# Patient Record
Sex: FEMALE | Race: BLACK OR AFRICAN AMERICAN | NOT HISPANIC OR LATINO | URBAN - METROPOLITAN AREA
[De-identification: names, ages, dates, MRNs, and addresses within clinical notes are randomized per-mention and may not be internally consistent; named-entity substitution may affect disease eponyms.]

---

## 2024-01-07 ENCOUNTER — INPATIENT (INPATIENT)
Facility: HOSPITAL | Age: 74
LOS: 2 days | Discharge: HOME HEALTH | DRG: 552 | End: 2024-01-10
Attending: STUDENT IN AN ORGANIZED HEALTH CARE EDUCATION/TRAINING PROGRAM | Admitting: STUDENT IN AN ORGANIZED HEALTH CARE EDUCATION/TRAINING PROGRAM
Payer: MEDICARE

## 2024-01-07 VITALS
RESPIRATION RATE: 16 BRPM | SYSTOLIC BLOOD PRESSURE: 151 MMHG | TEMPERATURE: 98 F | WEIGHT: 175.05 LBS | HEART RATE: 88 BPM | HEIGHT: 66 IN | OXYGEN SATURATION: 98 % | DIASTOLIC BLOOD PRESSURE: 84 MMHG

## 2024-01-07 DIAGNOSIS — M54.30 SCIATICA, UNSPECIFIED SIDE: ICD-10-CM

## 2024-01-07 DIAGNOSIS — I10 ESSENTIAL (PRIMARY) HYPERTENSION: ICD-10-CM

## 2024-01-07 DIAGNOSIS — Z29.9 ENCOUNTER FOR PROPHYLACTIC MEASURES, UNSPECIFIED: ICD-10-CM

## 2024-01-07 DIAGNOSIS — M54.50 LOW BACK PAIN, UNSPECIFIED: ICD-10-CM

## 2024-01-07 DIAGNOSIS — Z85.3 PERSONAL HISTORY OF MALIGNANT NEOPLASM OF BREAST: ICD-10-CM

## 2024-01-07 LAB
ANION GAP SERPL CALC-SCNC: 12 MMOL/L — SIGNIFICANT CHANGE UP (ref 5–17)
ANION GAP SERPL CALC-SCNC: 12 MMOL/L — SIGNIFICANT CHANGE UP (ref 5–17)
APPEARANCE UR: CLEAR — SIGNIFICANT CHANGE UP
APPEARANCE UR: CLEAR — SIGNIFICANT CHANGE UP
BACTERIA # UR AUTO: NEGATIVE /HPF — SIGNIFICANT CHANGE UP
BACTERIA # UR AUTO: NEGATIVE /HPF — SIGNIFICANT CHANGE UP
BASOPHILS # BLD AUTO: 0.04 K/UL — SIGNIFICANT CHANGE UP (ref 0–0.2)
BASOPHILS # BLD AUTO: 0.04 K/UL — SIGNIFICANT CHANGE UP (ref 0–0.2)
BASOPHILS NFR BLD AUTO: 0.3 % — SIGNIFICANT CHANGE UP (ref 0–2)
BASOPHILS NFR BLD AUTO: 0.3 % — SIGNIFICANT CHANGE UP (ref 0–2)
BILIRUB UR-MCNC: NEGATIVE — SIGNIFICANT CHANGE UP
BILIRUB UR-MCNC: NEGATIVE — SIGNIFICANT CHANGE UP
BUN SERPL-MCNC: 12 MG/DL — SIGNIFICANT CHANGE UP (ref 7–23)
BUN SERPL-MCNC: 12 MG/DL — SIGNIFICANT CHANGE UP (ref 7–23)
CALCIUM SERPL-MCNC: 9.6 MG/DL — SIGNIFICANT CHANGE UP (ref 8.4–10.5)
CALCIUM SERPL-MCNC: 9.6 MG/DL — SIGNIFICANT CHANGE UP (ref 8.4–10.5)
CAST: 0 /LPF — SIGNIFICANT CHANGE UP (ref 0–4)
CAST: 0 /LPF — SIGNIFICANT CHANGE UP (ref 0–4)
CHLORIDE SERPL-SCNC: 103 MMOL/L — SIGNIFICANT CHANGE UP (ref 96–108)
CHLORIDE SERPL-SCNC: 103 MMOL/L — SIGNIFICANT CHANGE UP (ref 96–108)
CO2 SERPL-SCNC: 24 MMOL/L — SIGNIFICANT CHANGE UP (ref 22–31)
CO2 SERPL-SCNC: 24 MMOL/L — SIGNIFICANT CHANGE UP (ref 22–31)
COLOR SPEC: YELLOW — SIGNIFICANT CHANGE UP
COLOR SPEC: YELLOW — SIGNIFICANT CHANGE UP
CREAT SERPL-MCNC: 0.88 MG/DL — SIGNIFICANT CHANGE UP (ref 0.5–1.3)
CREAT SERPL-MCNC: 0.88 MG/DL — SIGNIFICANT CHANGE UP (ref 0.5–1.3)
DIFF PNL FLD: NEGATIVE — SIGNIFICANT CHANGE UP
DIFF PNL FLD: NEGATIVE — SIGNIFICANT CHANGE UP
EGFR: 70 ML/MIN/1.73M2 — SIGNIFICANT CHANGE UP
EGFR: 70 ML/MIN/1.73M2 — SIGNIFICANT CHANGE UP
EOSINOPHIL # BLD AUTO: 0.04 K/UL — SIGNIFICANT CHANGE UP (ref 0–0.5)
EOSINOPHIL # BLD AUTO: 0.04 K/UL — SIGNIFICANT CHANGE UP (ref 0–0.5)
EOSINOPHIL NFR BLD AUTO: 0.3 % — SIGNIFICANT CHANGE UP (ref 0–6)
EOSINOPHIL NFR BLD AUTO: 0.3 % — SIGNIFICANT CHANGE UP (ref 0–6)
GLUCOSE SERPL-MCNC: 103 MG/DL — HIGH (ref 70–99)
GLUCOSE SERPL-MCNC: 103 MG/DL — HIGH (ref 70–99)
GLUCOSE UR QL: NEGATIVE MG/DL — SIGNIFICANT CHANGE UP
GLUCOSE UR QL: NEGATIVE MG/DL — SIGNIFICANT CHANGE UP
HCT VFR BLD CALC: 38.4 % — SIGNIFICANT CHANGE UP (ref 34.5–45)
HCT VFR BLD CALC: 38.4 % — SIGNIFICANT CHANGE UP (ref 34.5–45)
HGB BLD-MCNC: 13 G/DL — SIGNIFICANT CHANGE UP (ref 11.5–15.5)
HGB BLD-MCNC: 13 G/DL — SIGNIFICANT CHANGE UP (ref 11.5–15.5)
IMM GRANULOCYTES NFR BLD AUTO: 0.2 % — SIGNIFICANT CHANGE UP (ref 0–0.9)
IMM GRANULOCYTES NFR BLD AUTO: 0.2 % — SIGNIFICANT CHANGE UP (ref 0–0.9)
KETONES UR-MCNC: NEGATIVE MG/DL — SIGNIFICANT CHANGE UP
KETONES UR-MCNC: NEGATIVE MG/DL — SIGNIFICANT CHANGE UP
LEUKOCYTE ESTERASE UR-ACNC: ABNORMAL
LEUKOCYTE ESTERASE UR-ACNC: ABNORMAL
LYMPHOCYTES # BLD AUTO: 18.7 % — SIGNIFICANT CHANGE UP (ref 13–44)
LYMPHOCYTES # BLD AUTO: 18.7 % — SIGNIFICANT CHANGE UP (ref 13–44)
LYMPHOCYTES # BLD AUTO: 2.31 K/UL — SIGNIFICANT CHANGE UP (ref 1–3.3)
LYMPHOCYTES # BLD AUTO: 2.31 K/UL — SIGNIFICANT CHANGE UP (ref 1–3.3)
MCHC RBC-ENTMCNC: 27.9 PG — SIGNIFICANT CHANGE UP (ref 27–34)
MCHC RBC-ENTMCNC: 27.9 PG — SIGNIFICANT CHANGE UP (ref 27–34)
MCHC RBC-ENTMCNC: 33.9 GM/DL — SIGNIFICANT CHANGE UP (ref 32–36)
MCHC RBC-ENTMCNC: 33.9 GM/DL — SIGNIFICANT CHANGE UP (ref 32–36)
MCV RBC AUTO: 82.4 FL — SIGNIFICANT CHANGE UP (ref 80–100)
MCV RBC AUTO: 82.4 FL — SIGNIFICANT CHANGE UP (ref 80–100)
MONOCYTES # BLD AUTO: 0.74 K/UL — SIGNIFICANT CHANGE UP (ref 0–0.9)
MONOCYTES # BLD AUTO: 0.74 K/UL — SIGNIFICANT CHANGE UP (ref 0–0.9)
MONOCYTES NFR BLD AUTO: 6 % — SIGNIFICANT CHANGE UP (ref 2–14)
MONOCYTES NFR BLD AUTO: 6 % — SIGNIFICANT CHANGE UP (ref 2–14)
NEUTROPHILS # BLD AUTO: 9.19 K/UL — HIGH (ref 1.8–7.4)
NEUTROPHILS # BLD AUTO: 9.19 K/UL — HIGH (ref 1.8–7.4)
NEUTROPHILS NFR BLD AUTO: 74.5 % — SIGNIFICANT CHANGE UP (ref 43–77)
NEUTROPHILS NFR BLD AUTO: 74.5 % — SIGNIFICANT CHANGE UP (ref 43–77)
NITRITE UR-MCNC: NEGATIVE — SIGNIFICANT CHANGE UP
NITRITE UR-MCNC: NEGATIVE — SIGNIFICANT CHANGE UP
NRBC # BLD: 0 /100 WBCS — SIGNIFICANT CHANGE UP (ref 0–0)
NRBC # BLD: 0 /100 WBCS — SIGNIFICANT CHANGE UP (ref 0–0)
PH UR: 6.5 — SIGNIFICANT CHANGE UP (ref 5–8)
PH UR: 6.5 — SIGNIFICANT CHANGE UP (ref 5–8)
PLATELET # BLD AUTO: 235 K/UL — SIGNIFICANT CHANGE UP (ref 150–400)
PLATELET # BLD AUTO: 235 K/UL — SIGNIFICANT CHANGE UP (ref 150–400)
POTASSIUM SERPL-MCNC: 3.8 MMOL/L — SIGNIFICANT CHANGE UP (ref 3.5–5.3)
POTASSIUM SERPL-MCNC: 3.8 MMOL/L — SIGNIFICANT CHANGE UP (ref 3.5–5.3)
POTASSIUM SERPL-SCNC: 3.8 MMOL/L — SIGNIFICANT CHANGE UP (ref 3.5–5.3)
POTASSIUM SERPL-SCNC: 3.8 MMOL/L — SIGNIFICANT CHANGE UP (ref 3.5–5.3)
PROT UR-MCNC: 30 MG/DL
PROT UR-MCNC: 30 MG/DL
RBC # BLD: 4.66 M/UL — SIGNIFICANT CHANGE UP (ref 3.8–5.2)
RBC # BLD: 4.66 M/UL — SIGNIFICANT CHANGE UP (ref 3.8–5.2)
RBC # FLD: 14.6 % — HIGH (ref 10.3–14.5)
RBC # FLD: 14.6 % — HIGH (ref 10.3–14.5)
RBC CASTS # UR COMP ASSIST: 1 /HPF — SIGNIFICANT CHANGE UP (ref 0–4)
RBC CASTS # UR COMP ASSIST: 1 /HPF — SIGNIFICANT CHANGE UP (ref 0–4)
SODIUM SERPL-SCNC: 139 MMOL/L — SIGNIFICANT CHANGE UP (ref 135–145)
SODIUM SERPL-SCNC: 139 MMOL/L — SIGNIFICANT CHANGE UP (ref 135–145)
SP GR SPEC: 1.01 — SIGNIFICANT CHANGE UP (ref 1–1.03)
SP GR SPEC: 1.01 — SIGNIFICANT CHANGE UP (ref 1–1.03)
SQUAMOUS # UR AUTO: 1 /HPF — SIGNIFICANT CHANGE UP (ref 0–5)
SQUAMOUS # UR AUTO: 1 /HPF — SIGNIFICANT CHANGE UP (ref 0–5)
UROBILINOGEN FLD QL: 0.2 MG/DL — SIGNIFICANT CHANGE UP (ref 0.2–1)
UROBILINOGEN FLD QL: 0.2 MG/DL — SIGNIFICANT CHANGE UP (ref 0.2–1)
WBC # BLD: 12.35 K/UL — HIGH (ref 3.8–10.5)
WBC # BLD: 12.35 K/UL — HIGH (ref 3.8–10.5)
WBC # FLD AUTO: 12.35 K/UL — HIGH (ref 3.8–10.5)
WBC # FLD AUTO: 12.35 K/UL — HIGH (ref 3.8–10.5)
WBC UR QL: 2 /HPF — SIGNIFICANT CHANGE UP (ref 0–5)
WBC UR QL: 2 /HPF — SIGNIFICANT CHANGE UP (ref 0–5)

## 2024-01-07 PROCEDURE — 72131 CT LUMBAR SPINE W/O DYE: CPT | Mod: 26,MG

## 2024-01-07 PROCEDURE — 99285 EMERGENCY DEPT VISIT HI MDM: CPT | Mod: 25

## 2024-01-07 PROCEDURE — 99223 1ST HOSP IP/OBS HIGH 75: CPT | Mod: GC

## 2024-01-07 PROCEDURE — G1004: CPT

## 2024-01-07 RX ORDER — ENOXAPARIN SODIUM 100 MG/ML
40 INJECTION SUBCUTANEOUS EVERY 24 HOURS
Refills: 0 | Status: DISCONTINUED | OUTPATIENT
Start: 2024-01-07 | End: 2024-01-10

## 2024-01-07 RX ORDER — ONDANSETRON 8 MG/1
4 TABLET, FILM COATED ORAL EVERY 8 HOURS
Refills: 0 | Status: DISCONTINUED | OUTPATIENT
Start: 2024-01-07 | End: 2024-01-10

## 2024-01-07 RX ORDER — ACETAMINOPHEN 500 MG
650 TABLET ORAL EVERY 6 HOURS
Refills: 0 | Status: DISCONTINUED | OUTPATIENT
Start: 2024-01-07 | End: 2024-01-10

## 2024-01-07 RX ORDER — MORPHINE SULFATE 50 MG/1
2 CAPSULE, EXTENDED RELEASE ORAL ONCE
Refills: 0 | Status: DISCONTINUED | OUTPATIENT
Start: 2024-01-07 | End: 2024-01-07

## 2024-01-07 RX ORDER — TRAMADOL HYDROCHLORIDE 50 MG/1
50 TABLET ORAL ONCE
Refills: 0 | Status: DISCONTINUED | OUTPATIENT
Start: 2024-01-07 | End: 2024-01-07

## 2024-01-07 RX ORDER — LANOLIN ALCOHOL/MO/W.PET/CERES
3 CREAM (GRAM) TOPICAL AT BEDTIME
Refills: 0 | Status: DISCONTINUED | OUTPATIENT
Start: 2024-01-07 | End: 2024-01-10

## 2024-01-07 RX ORDER — ACETAMINOPHEN WITH CODEINE 300MG-30MG
1 TABLET ORAL ONCE
Refills: 0 | Status: DISCONTINUED | OUTPATIENT
Start: 2024-01-07 | End: 2024-01-07

## 2024-01-07 RX ORDER — AMLODIPINE BESYLATE 2.5 MG/1
10 TABLET ORAL DAILY
Refills: 0 | Status: DISCONTINUED | OUTPATIENT
Start: 2024-01-07 | End: 2024-01-10

## 2024-01-07 RX ORDER — DIAZEPAM 5 MG
5 TABLET ORAL ONCE
Refills: 0 | Status: DISCONTINUED | OUTPATIENT
Start: 2024-01-07 | End: 2024-01-07

## 2024-01-07 RX ORDER — KETOROLAC TROMETHAMINE 30 MG/ML
15 SYRINGE (ML) INJECTION ONCE
Refills: 0 | Status: DISCONTINUED | OUTPATIENT
Start: 2024-01-07 | End: 2024-01-07

## 2024-01-07 RX ORDER — METHOCARBAMOL 500 MG/1
750 TABLET, FILM COATED ORAL ONCE
Refills: 0 | Status: COMPLETED | OUTPATIENT
Start: 2024-01-07 | End: 2024-01-07

## 2024-01-07 RX ORDER — BACLOFEN 100 %
5 POWDER (GRAM) MISCELLANEOUS EVERY 12 HOURS
Refills: 0 | Status: DISCONTINUED | OUTPATIENT
Start: 2024-01-07 | End: 2024-01-10

## 2024-01-07 RX ADMIN — TRAMADOL HYDROCHLORIDE 50 MILLIGRAM(S): 50 TABLET ORAL at 12:56

## 2024-01-07 RX ADMIN — Medication 650 MILLIGRAM(S): at 22:51

## 2024-01-07 RX ADMIN — Medication 15 MILLIGRAM(S): at 07:51

## 2024-01-07 RX ADMIN — Medication 5 MILLIGRAM(S): at 10:01

## 2024-01-07 RX ADMIN — MORPHINE SULFATE 2 MILLIGRAM(S): 50 CAPSULE, EXTENDED RELEASE ORAL at 11:08

## 2024-01-07 RX ADMIN — Medication 15 MILLIGRAM(S): at 08:50

## 2024-01-07 RX ADMIN — Medication 1 TABLET(S): at 07:50

## 2024-01-07 RX ADMIN — AMLODIPINE BESYLATE 10 MILLIGRAM(S): 2.5 TABLET ORAL at 19:48

## 2024-01-07 RX ADMIN — Medication 650 MILLIGRAM(S): at 23:51

## 2024-01-07 RX ADMIN — METHOCARBAMOL 750 MILLIGRAM(S): 500 TABLET, FILM COATED ORAL at 07:50

## 2024-01-07 RX ADMIN — Medication 1 TABLET(S): at 20:31

## 2024-01-07 RX ADMIN — Medication 3 MILLIGRAM(S): at 22:51

## 2024-01-07 NOTE — PHYSICAL THERAPY INITIAL EVALUATION ADULT - FOLLOWS COMMANDS/ANSWERS QUESTIONS, REHAB EVAL
easily distractible requiring redirecting to task at hand frequently throughout session/100% of the time

## 2024-01-07 NOTE — PHYSICAL THERAPY INITIAL EVALUATION ADULT - PERTINENT HX OF CURRENT PROBLEM, REHAB EVAL
This is a 72 y/o f hx HTN, sciatica, breast CA (remission) presents c/o left buttock pain radiating to left leg today.  Pt stating she feels "pulling" in the area which feels similar to muscle spasms she has had in the past.  Pt stating her symptoms started gradually 2 days ago and has worsened since then.  Pt took gabapentin without improvement.  Pt stating today she hasn't been able to walk, tried getting out of bed but wasn't able to take a step due to pain so called EMS.  Pt denies numbness/tingling to ext, weakness, saddle anesthesia, bowel/bladder incontinence, trauma, fall, all other ROS negative. This is a 74 y/o f hx HTN, sciatica, breast CA (remission) presents c/o left buttock pain radiating to left leg today.  Pt stating she feels "pulling" in the area which feels similar to muscle spasms she has had in the past.  Pt stating her symptoms started gradually 2 days ago and has worsened since then.  Pt took gabapentin without improvement.  Pt stating today she hasn't been able to walk, tried getting out of bed but wasn't able to take a step due to pain so called EMS.  Pt denies numbness/tingling to ext, weakness, saddle anesthesia, bowel/bladder incontinence, trauma, fall, all other ROS negative.

## 2024-01-07 NOTE — PHYSICAL THERAPY INITIAL EVALUATION ADULT - IMPAIRMENTS FOUND, PT EVAL
aerobic capacity/endurance/ergonomics and body mechanics/gait, locomotion, and balance/gross motor/muscle strength/poor safety awareness/posture/ROM

## 2024-01-07 NOTE — PHYSICAL THERAPY INITIAL EVALUATION ADULT - GENERAL OBSERVATIONS, REHAB EVAL
Pt received semi-supine in bed, no acute distress, cleared for PT by ED RN and CM. Left as found semi-supine in bed no acute distress VSS.

## 2024-01-07 NOTE — PHYSICAL THERAPY INITIAL EVALUATION ADULT - IMPAIRMENTS CONTRIBUTING TO GAIT DEVIATIONS, PT EVAL
distance limited by pt reporting 10/10 pain in LLE despite pre-medication for session/impaired balance/impaired coordination/decreased flexibility/pain/impaired postural control/decreased ROM/decreased strength

## 2024-01-07 NOTE — H&P ADULT - HISTORY OF PRESENT ILLNESS
PCP:   Pharmacy:   - - - - -    HPI:     In the ED:  Initial vital signs: T: XX F, HR: XX, BP: XX, R: XX, SpO2: XX% on RA  ED course:   Labs: significant for  Imaging:  CXR:   EKG:   Medications:   Consults: none      PCP:   Pharmacy:   - - - - -    HPI: This is a 74 y/o f hx HTN, sciatica, breast CA (remission) presents c/o left buttock pain radiating to left leg today.  Pt stating she feels "pulling" in the area which feels similar to muscle spasms she has had in the past.  Pt stating her symptoms started gradually 2 days ago and has worsened since then.  Pt took gabapentin without improvement.  Pt stating today she hasn't been able to walk, tried getting out of bed but wasn't able to take a step due to pain so called EMS.  Pt denies numbness/tingling to ext, weakness, saddle anesthesia, bowel/bladder incontinence, trauma, fall, all other ROS negative.    In the ED:  Initial vital signs: T: 98F, HR: 88, BP: 151/84 R: 16, SpO2: 98% on RA  ED course:   Labs: significant for WBC 12.35, CMP WNL, UA negative  Imaging: CT L-spine shows:  No evidence of acute osseous fracture or jacy dislocation. Multilevel degenerative change of lumbar spine, most significantly at the L2-L3 level where there is moderate to severe narrowing of the spinal canal. In the setting of neuropathy, recommend MRI of the lumbar spine  for further evaluation.   CXR: NI  EKG: Pending  Medications: s/p tramadol, morphine 2mg IVP x1, robaxin 750mg x1  Consults: PT   PCP: Dr Bill  - - - - -    HPI: This is a 74 y/o f hx HTN, sciatica, breast CA (remission) presents c/o left buttock pain radiating to left leg today.  Pt stating she feels "pulling" in the area which feels similar to muscle spasms she has had in the past.  Pt stating her symptoms started gradually 2 days ago and has worsened since then.  Pt took gabapentin without improvement.  Pt stating today she hasn't been able to walk, tried getting out of bed but wasn't able to take a step due to pain so called EMS.  Pt denies numbness/tingling to ext, weakness, saddle anesthesia, bowel/bladder incontinence, trauma, fall, all other ROS negative.    In the ED:  Initial vital signs: T: 98F, HR: 88, BP: 151/84 R: 16, SpO2: 98% on RA  ED course:   Labs: significant for WBC 12.35, CMP WNL, UA negative  Imaging: CT L-spine shows:  No evidence of acute osseous fracture or jacy dislocation. Multilevel degenerative change of lumbar spine, most significantly at the L2-L3 level where there is moderate to severe narrowing of the spinal canal. In the setting of neuropathy, recommend MRI of the lumbar spine  for further evaluation.   CXR: NI  EKG: Pending  Medications: s/p tramadol, morphine 2mg IVP x1, robaxin 750mg x1  Consults: PT   PCP: Dr Bill  - - - - -    HPI: This is a 72 y/o f hx HTN, sciatica, breast CA (remission) presents c/o left buttock pain radiating to left leg today.  Pt stating she feels "pulling" in the area which feels similar to muscle spasms she has had in the past.  Pt stating her symptoms started gradually 2 days ago and has worsened since then.  Pt took gabapentin without improvement.  Pt stating today she hasn't been able to walk, tried getting out of bed but wasn't able to take a step due to pain so called EMS.  Pt denies numbness/tingling to ext, weakness, saddle anesthesia, bowel/bladder incontinence, trauma, fall, all other ROS negative.    In the ED:  Initial vital signs: T: 98F, HR: 88, BP: 151/84 R: 16, SpO2: 98% on RA  ED course:   Labs: significant for WBC 12.35, CMP WNL, UA negative  Imaging: CT L-spine shows:  No evidence of acute osseous fracture or jacy dislocation. Multilevel degenerative change of lumbar spine, most significantly at the L2-L3 level where there is moderate to severe narrowing of the spinal canal. In the setting of neuropathy, recommend MRI of the lumbar spine  for further evaluation.   CXR: NI  EKG: Pending  Medications: s/p tramadol, morphine 2mg IVP x1, robaxin 750mg x1  Consults: PT

## 2024-01-07 NOTE — PHYSICAL THERAPY INITIAL EVALUATION ADULT - IMPAIRMENTS CONTRIBUTING IMPAIRED BED MOBILITY, REHAB EVAL
Negative impaired balance/impaired coordination/decreased flexibility/pain/impaired postural control/decreased ROM/decreased strength

## 2024-01-07 NOTE — ED PROVIDER NOTE - OBJECTIVE STATEMENT
72 y/o f hx HTN, sciatica, breast CA (remission) presents c/o left buttock pain radiating to left leg today.  Pt stating she feels "pulling" in the area which feels similar to muscle spasms she has had in the past.  Pt stating her symptoms started gradually 2 days ago and has worsened since then.  Pt took gabapentin without improvement.  Pt stating today she hasn't been able to walk, tried getting out of bed but wasn't able to take a step due to pain so called EMS.  Pt denies numbness/tingling to ext, weakness, saddle anesthesia, bowel/bladder incontinence, trauma, fall, all other ROS negative. 74 y/o f hx HTN, sciatica, breast CA (remission) presents c/o left buttock pain radiating to left leg today.  Pt stating she feels "pulling" in the area which feels similar to muscle spasms she has had in the past.  Pt stating her symptoms started gradually 2 days ago and has worsened since then.  Pt took gabapentin without improvement.  Pt stating today she hasn't been able to walk, tried getting out of bed but wasn't able to take a step due to pain so called EMS.  Pt denies numbness/tingling to ext, weakness, saddle anesthesia, bowel/bladder incontinence, trauma, fall, all other ROS negative.

## 2024-01-07 NOTE — ED PROVIDER NOTE - CLINICAL SUMMARY MEDICAL DECISION MAKING FREE TEXT BOX
74 y/o f hx HTN, sciatica, breast CA (remission) presents c/o gradually worsening pain to left low back/buttock radiating to left leg.  Sx consistent with sciatica, muscle spasm, no neuro deficits in ED.  Pt given pain meds, will get CT lumbar spine, f/u results and reassess for improvement.

## 2024-01-07 NOTE — PHYSICAL THERAPY INITIAL EVALUATION ADULT - ADDITIONAL COMMENTS
Pt lives alone in an elevator building, no [place], has ramp into building. Pt has [facility] services 3d x 4h on Mon, Tues & Wed 9am-1pm. Pt also has involved children. Equipment in home: Cane, rollator, [employer], commode near bed.

## 2024-01-07 NOTE — H&P ADULT - PROBLEM SELECTOR PLAN 5
F: NI  E: Replete as needed  N: Regular diet  Dvt ppx: Lovenox  GI ppx: NI  Code status: FULL code    #PT eval

## 2024-01-07 NOTE — PHYSICAL THERAPY INITIAL EVALUATION ADULT - GAIT DEVIATIONS NOTED, PT EVAL
decreased bladimir/increased time in double stance/decreased step length/decreased stride length/decreased weight-shifting ability

## 2024-01-07 NOTE — H&P ADULT - ATTENDING COMMENTS
#Lumbar radiculopathy: p/w acute on chronic back pain, worsened this week, with LLE sciatica. No Red flad symptoms- no incontinence. 5/5 MS b/l LE, and sensation intact. Ddx lumbar radiculopathy vs muscle strain/spasm. CT w/ mod-sever degenerative changes/spinal stenosis. F/up MRI, c/w pain control, baclofen, serial exams. PT recs

## 2024-01-07 NOTE — H&P ADULT - ASSESSMENT
This is a 72 y/o f hx HTN, sciatica, breast CA (remission) presents c/o left buttock and L groni pain and muscle spasms radiating to left leg today, admitted for further mgmt and PT eval.

## 2024-01-07 NOTE — PHYSICAL THERAPY INITIAL EVALUATION ADULT - NSPTDISCHREC_GEN_A_CORE
Sub-acute Rehab vs Home PT pending functional progress with mobility, balance, improvement in pain control with ambulation

## 2024-01-07 NOTE — H&P ADULT - PROBLEM SELECTOR PLAN 1
P/w  left buttock pain and muscle spasms radiating to left leg today. She has had similar episodes in the past.   CT L-spine shows:  No evidence of acute osseous fracture or jacy dislocation. Multilevel degenerative change of lumbar spine, most significantly at the L2-L3 level where there is moderate to severe narrowing of the spinal canal. In the setting of neuropathy, recommend MRI of the lumbar spine  for further evaluation.  S/p tramadol, morphine 2mg x1, methocarbamol 750mg x1, ketorolac 15mg x1, and valium 5mg x1.    Plan:  - PT eval  - Pain control; tylenol for mild, lidocaine patch to affected area P/w  left buttock pain and muscle spasms radiating to left leg today. She has had similar episodes in the past.   CT L-spine shows:  No evidence of acute osseous fracture or jacy dislocation. Multilevel degenerative change of lumbar spine, most significantly at the L2-L3 level where there is moderate to severe narrowing of the spinal canal. In the setting of neuropathy, recommend MRI of the lumbar spine  for further evaluation. Likely ddx spinal stenosis, piriformis syndrome given gluteal location and muscle spasms, disc herniation. No s/s of saddle anesthesia, bowel/bladder incontinence.  S/p tramadol, morphine 2mg x1, methocarbamol 750mg x1, ketorolac 15mg x1, and valium 5mg x1.  Neuro exam:    Plan:  - PT eval  - Pain control; tylenol for mild, lidocaine patch to affected area  - MR L-spine P/w  left buttock pain and muscle spasms radiating to left leg today. She has had similar episodes in the past.   CT L-spine shows:  No evidence of acute osseous fracture or jacy dislocation. Multilevel degenerative change of lumbar spine, most significantly at the L2-L3 level where there is moderate to severe narrowing of the spinal canal. In the setting of neuropathy, recommend MRI of the lumbar spine  for further evaluation. Likely ddx spinal stenosis, piriformis syndrome given gluteal location and muscle spasms, disc herniation. No s/s of saddle anesthesia, bowel/bladder incontinence. Walks independently.  S/p tramadol, morphine 2mg x1, methocarbamol 750mg x1, ketorolac 15mg x1, and valium 5mg x1.  Neuro exam: MSK 5/5, sensation intact, +straight leg raise test    Plan:  - PT eval  - Pain control; tylenol for mild, lidocaine patch to affected area  - MR L-spine

## 2024-01-07 NOTE — ED ADULT NURSE REASSESSMENT NOTE - NS ED NURSE REASSESS COMMENT FT1
Pt failed ambulatory trial with brian garner at bedside. Pending PT consult. Placed on Primafit. Pt educated on use and verbalized understanding.
Report from DAKOTA Sequeira. Pt received in stretcher c/o lower back pain radiating to pelvis and down b/l legs. Pt pending ct and ua/uc. Pt to CT scan. Updated as to plan of care. Pt verbalized understanding.
pt received from day shift RN - as per report pt to be admitted to the hospital for back pain - unable to amb due to the pain. at this time pt is sitting comfortably on stretcher states the pain improves when laying down and not moving, however "my left leg has an occasional spasm." pt was given BP medication prior to change of shift - denies any HA/lightheadedness/dizziness. at this time pt is pending report and transport to room. nursing plan continues at this time

## 2024-01-07 NOTE — H&P ADULT - NSHPPHYSICALEXAM_GEN_ALL_CORE
PHYSICAL EXAM:  Constitutional: NAD, comfortable in bed.  HEENT: NC/AT, PERRLA, EOMI, no conjunctival pallor or scleral icterus, mucus membranes moist.  Neck: Supple, no JVD. My thyromegaly or masses.  Respiratory: Clear to auscultation B/L. No wheezing, rales, rhonchi.  Cardiovascular: RRR, normal S1 and S2, no murmurs, rubs, gallops.  Gastrointestinal: +BS, soft NTND, no guarding or rebound tenderness, no palpable masses.  Extremities: Warm well perfused. No cyanosis, clubbing or edema.   Vascular: +2 pulses equal and strong throughout.   Neurological: AAOx3, no CN deficits, strength and sensation intact throughout.   Skin: No gross skin abnormalities or rashes.

## 2024-01-07 NOTE — PHYSICAL THERAPY INITIAL EVALUATION ADULT - CRITERIA FOR SKILLED THERAPEUTIC INTERVENTIONS
impairments found/functional limitations in following categories/risk reduction/prevention/rehab potential/therapy frequency/predicted duration of therapy intervention/anticipated equipment needs at discharge/anticipated discharge recommendation [facility] Counseling: I discussed with the patient the risks of valacyclovir including but not limited to kidney damage, nausea, vomiting and severe allergy.  The patient understands that if the infection seems to be worsening or is not improving, they are to call.

## 2024-01-07 NOTE — H&P ADULT - NSHPLABSRESULTS_GEN_ALL_CORE
LABS:                         13.0   12.35 )-----------( 235      ( 2024 07:30 )             38.4     01-07    139  |  103  |  12  ----------------------------<  103<H>  3.8   |  24  |  0.88    Ca    9.6      2024 07:30        Urinalysis Basic - ( 2024 08:15 )    Color: Yellow / Appearance: Clear / S.015 / pH: x  Gluc: x / Ketone: Negative mg/dL  / Bili: Negative / Urobili: 0.2 mg/dL   Blood: x / Protein: 30 mg/dL / Nitrite: Negative   Leuk Esterase: Trace / RBC: 1 /HPF / WBC 2 /HPF   Sq Epi: x / Non Sq Epi: 1 /HPF / Bacteria: Negative /HPF                RADIOLOGY, EKG & ADDITIONAL TESTS:

## 2024-01-07 NOTE — ED ADULT NURSE NOTE - OBJECTIVE STATEMENT
Pt. presents with sharp left leg pain, worse on movement.    It radiates from her left lower back, to buttocks to leg.  HX of arthritis

## 2024-01-07 NOTE — ED ADULT TRIAGE NOTE - CHIEF COMPLAINT QUOTE
low back pain that goes to left leg for 3-4 days; has been taking Gabapentin  with mild relief; denies fall or injury, no urinary sx

## 2024-01-07 NOTE — PHYSICAL THERAPY INITIAL EVALUATION ADULT - ACTIVE RANGE OF MOTION EXAMINATION, REHAB EVAL
LLE AROM limited by pain with mobility/Left UE Active ROM was WNL (within normal limits)/Right UE Active ROM was WNL (within normal limits)/RLE Active ROM was WNL (within normal limits)/deficits as listed below

## 2024-01-07 NOTE — PATIENT PROFILE ADULT - FALL HARM RISK - RISK INTERVENTIONS
Assistance OOB with selected safe patient handling equipment/Communicate Fall Risk and Risk Factors to all staff, patient, and family/Discuss with provider need for PT consult/Monitor gait and stability/Provide patient with walking aids - walker, cane, crutches/Reinforce activity limits and safety measures with patient and family/Visual Cue: Yellow wristband/Bed in lowest position, wheels locked, appropriate side rails in place/Call bell, personal items and telephone in reach/Instruct patient to call for assistance before getting out of bed or chair/Non-slip footwear when patient is out of bed/[place] to call system/Physically safe environment - no spills, clutter or unnecessary equipment/Purposeful Proactive Rounding/Room/bathroom lighting operational, light cord in reach Assistance OOB with selected safe patient handling equipment/Communicate Fall Risk and Risk Factors to all staff, patient, and family/Discuss with provider need for PT consult/Monitor gait and stability/Provide patient with walking aids - walker, cane, crutches/Reinforce activity limits and safety measures with patient and family/Visual Cue: Yellow wristband/Bed in lowest position, wheels locked, appropriate side rails in place/Call bell, personal items and telephone in reach/Instruct patient to call for assistance before getting out of bed or chair/Non-slip footwear when patient is out of bed/[place] to call system/Physically safe environment - no spills, clutter or unnecessary equipment/Purposeful Proactive Rounding/Room/bathroom lighting operational, light cord in reach

## 2024-01-07 NOTE — ED PROVIDER NOTE - PROGRESS NOTE DETAILS
pt with persistent pain, able to stand but not steady on her feet.  PT evaluated and do not recommend d/c as pt is a fall risk, will admit for further pain management, PT

## 2024-01-07 NOTE — ED ADULT NURSE NOTE - NSFALLRISKINTERV_ED_ALL_ED
Assistance OOB with selected safe patient handling equipment if applicable/Assistance with ambulation/Communicate fall risk and risk factors to all staff, patient, and family/Monitor gait and stability/Provide patient with walking aids/Provide visual cue: yellow wristband, yellow gown, etc/Reinforce activity limits and safety measures with patient and family/Call bell, personal items and telephone in reach/Instruct patient to call for assistance before getting out of bed/chair/stretcher/Non-slip footwear applied when patient is off stretcher/[place] to call system/Physically safe environment - no spills, clutter or unnecessary equipment/Purposeful Proactive Rounding/Room/bathroom lighting operational, light cord in reach Assistance OOB with selected safe patient handling equipment if applicable/Assistance with ambulation/Communicate fall risk and risk factors to all staff, patient, and family/Monitor gait and stability/Provide patient with walking aids/Provide visual cue: yellow wristband, yellow gown, etc/Reinforce activity limits and safety measures with patient and family/Call bell, personal items and telephone in reach/Instruct patient to call for assistance before getting out of bed/chair/stretcher/Non-slip footwear applied when patient is off stretcher/[place] to call system/Physically safe environment - no spills, clutter or unnecessary equipment/Purposeful Proactive Rounding/Room/bathroom lighting operational, light cord in reach

## 2024-01-07 NOTE — H&P ADULT - NSHPREVIEWOFSYSTEMS_GEN_ALL_CORE
---------------INCOMPLETE--------------------------    CONSTITUTIONAL: No fevers, chills, sweats, weakness, fatigue, or weight changes  HEENT: No visual or hearing changes;  No vertigo or throat pain   NECK: No pain or stiffness  RESPIRATORY: No cough, wheezing, hemoptysis; No shortness of breath  CARDIOVASCULAR: No chest pain or palpitations  GASTROINTESTINAL: No abdominal pain. No nausea, vomiting, diarrhea, or constipation. No melena.  GENITOURINARY: No dysuria, frequency or hematuria  NEUROLOGICAL: No numbness or weakness  SKIN: No itching, burning, rashes, or lesions   HEME: No bruising or bleeding  ENDO: No hypo-/hyper- thermia  All other review of systems as per HPI. CONSTITUTIONAL: No fevers, chills, sweats, weakness, fatigue, or weight changes  HEENT: No visual or hearing changes;  No vertigo or throat pain   NECK: No pain or stiffness  RESPIRATORY: No cough, wheezing, hemoptysis; No shortness of breath  CARDIOVASCULAR: No chest pain or palpitations  GASTROINTESTINAL: No abdominal pain. No nausea, vomiting, diarrhea, or constipation. No melena.  GENITOURINARY: No dysuria, frequency or hematuria  NEUROLOGICAL: No numbness or weakness  SKIN: No itching, burning, rashes, or lesions   HEME: No bruising or bleeding  ENDO: No hypo-/hyper- thermia  MSK: +B/l LE back pain with radiation down L groin and anterior thigh  All other review of systems as per HPI.

## 2024-01-07 NOTE — ED PROVIDER NOTE - MUSCULOSKELETAL, MLM
Spine appears normal, no midline spine tenderness, +left lower lumbar TTP, no swelling, no skin discoloration, limited ROM of left leg 2/2 pain

## 2024-01-08 ENCOUNTER — TRANSCRIPTION ENCOUNTER (OUTPATIENT)
Age: 74
End: 2024-01-08

## 2024-01-08 DIAGNOSIS — M54.16 RADICULOPATHY, LUMBAR REGION: ICD-10-CM

## 2024-01-08 LAB
ANION GAP SERPL CALC-SCNC: 9 MMOL/L — SIGNIFICANT CHANGE UP (ref 5–17)
ANION GAP SERPL CALC-SCNC: 9 MMOL/L — SIGNIFICANT CHANGE UP (ref 5–17)
BASOPHILS # BLD AUTO: 0.03 K/UL — SIGNIFICANT CHANGE UP (ref 0–0.2)
BASOPHILS # BLD AUTO: 0.03 K/UL — SIGNIFICANT CHANGE UP (ref 0–0.2)
BASOPHILS NFR BLD AUTO: 0.3 % — SIGNIFICANT CHANGE UP (ref 0–2)
BASOPHILS NFR BLD AUTO: 0.3 % — SIGNIFICANT CHANGE UP (ref 0–2)
BUN SERPL-MCNC: 12 MG/DL — SIGNIFICANT CHANGE UP (ref 7–23)
BUN SERPL-MCNC: 12 MG/DL — SIGNIFICANT CHANGE UP (ref 7–23)
CALCIUM SERPL-MCNC: 9.4 MG/DL — SIGNIFICANT CHANGE UP (ref 8.4–10.5)
CALCIUM SERPL-MCNC: 9.4 MG/DL — SIGNIFICANT CHANGE UP (ref 8.4–10.5)
CHLORIDE SERPL-SCNC: 100 MMOL/L — SIGNIFICANT CHANGE UP (ref 96–108)
CHLORIDE SERPL-SCNC: 100 MMOL/L — SIGNIFICANT CHANGE UP (ref 96–108)
CO2 SERPL-SCNC: 28 MMOL/L — SIGNIFICANT CHANGE UP (ref 22–31)
CO2 SERPL-SCNC: 28 MMOL/L — SIGNIFICANT CHANGE UP (ref 22–31)
CREAT SERPL-MCNC: 0.87 MG/DL — SIGNIFICANT CHANGE UP (ref 0.5–1.3)
CREAT SERPL-MCNC: 0.87 MG/DL — SIGNIFICANT CHANGE UP (ref 0.5–1.3)
EGFR: 71 ML/MIN/1.73M2 — SIGNIFICANT CHANGE UP
EGFR: 71 ML/MIN/1.73M2 — SIGNIFICANT CHANGE UP
EOSINOPHIL # BLD AUTO: 0.19 K/UL — SIGNIFICANT CHANGE UP (ref 0–0.5)
EOSINOPHIL # BLD AUTO: 0.19 K/UL — SIGNIFICANT CHANGE UP (ref 0–0.5)
EOSINOPHIL NFR BLD AUTO: 1.7 % — SIGNIFICANT CHANGE UP (ref 0–6)
EOSINOPHIL NFR BLD AUTO: 1.7 % — SIGNIFICANT CHANGE UP (ref 0–6)
GLUCOSE SERPL-MCNC: 93 MG/DL — SIGNIFICANT CHANGE UP (ref 70–99)
GLUCOSE SERPL-MCNC: 93 MG/DL — SIGNIFICANT CHANGE UP (ref 70–99)
HCT VFR BLD CALC: 42.7 % — SIGNIFICANT CHANGE UP (ref 34.5–45)
HCT VFR BLD CALC: 42.7 % — SIGNIFICANT CHANGE UP (ref 34.5–45)
HCV AB S/CO SERPL IA: 0.03 S/CO — SIGNIFICANT CHANGE UP
HCV AB S/CO SERPL IA: 0.03 S/CO — SIGNIFICANT CHANGE UP
HCV AB SERPL-IMP: SIGNIFICANT CHANGE UP
HCV AB SERPL-IMP: SIGNIFICANT CHANGE UP
HGB BLD-MCNC: 13.9 G/DL — SIGNIFICANT CHANGE UP (ref 11.5–15.5)
HGB BLD-MCNC: 13.9 G/DL — SIGNIFICANT CHANGE UP (ref 11.5–15.5)
IMM GRANULOCYTES NFR BLD AUTO: 0.2 % — SIGNIFICANT CHANGE UP (ref 0–0.9)
IMM GRANULOCYTES NFR BLD AUTO: 0.2 % — SIGNIFICANT CHANGE UP (ref 0–0.9)
LYMPHOCYTES # BLD AUTO: 2.36 K/UL — SIGNIFICANT CHANGE UP (ref 1–3.3)
LYMPHOCYTES # BLD AUTO: 2.36 K/UL — SIGNIFICANT CHANGE UP (ref 1–3.3)
LYMPHOCYTES # BLD AUTO: 21.1 % — SIGNIFICANT CHANGE UP (ref 13–44)
LYMPHOCYTES # BLD AUTO: 21.1 % — SIGNIFICANT CHANGE UP (ref 13–44)
MAGNESIUM SERPL-MCNC: 2.2 MG/DL — SIGNIFICANT CHANGE UP (ref 1.6–2.6)
MAGNESIUM SERPL-MCNC: 2.2 MG/DL — SIGNIFICANT CHANGE UP (ref 1.6–2.6)
MCHC RBC-ENTMCNC: 27.6 PG — SIGNIFICANT CHANGE UP (ref 27–34)
MCHC RBC-ENTMCNC: 27.6 PG — SIGNIFICANT CHANGE UP (ref 27–34)
MCHC RBC-ENTMCNC: 32.6 GM/DL — SIGNIFICANT CHANGE UP (ref 32–36)
MCHC RBC-ENTMCNC: 32.6 GM/DL — SIGNIFICANT CHANGE UP (ref 32–36)
MCV RBC AUTO: 84.7 FL — SIGNIFICANT CHANGE UP (ref 80–100)
MCV RBC AUTO: 84.7 FL — SIGNIFICANT CHANGE UP (ref 80–100)
MONOCYTES # BLD AUTO: 0.83 K/UL — SIGNIFICANT CHANGE UP (ref 0–0.9)
MONOCYTES # BLD AUTO: 0.83 K/UL — SIGNIFICANT CHANGE UP (ref 0–0.9)
MONOCYTES NFR BLD AUTO: 7.4 % — SIGNIFICANT CHANGE UP (ref 2–14)
MONOCYTES NFR BLD AUTO: 7.4 % — SIGNIFICANT CHANGE UP (ref 2–14)
NEUTROPHILS # BLD AUTO: 7.73 K/UL — HIGH (ref 1.8–7.4)
NEUTROPHILS # BLD AUTO: 7.73 K/UL — HIGH (ref 1.8–7.4)
NEUTROPHILS NFR BLD AUTO: 69.3 % — SIGNIFICANT CHANGE UP (ref 43–77)
NEUTROPHILS NFR BLD AUTO: 69.3 % — SIGNIFICANT CHANGE UP (ref 43–77)
NRBC # BLD: 0 /100 WBCS — SIGNIFICANT CHANGE UP (ref 0–0)
NRBC # BLD: 0 /100 WBCS — SIGNIFICANT CHANGE UP (ref 0–0)
PHOSPHATE SERPL-MCNC: 3.4 MG/DL — SIGNIFICANT CHANGE UP (ref 2.5–4.5)
PHOSPHATE SERPL-MCNC: 3.4 MG/DL — SIGNIFICANT CHANGE UP (ref 2.5–4.5)
PLATELET # BLD AUTO: 250 K/UL — SIGNIFICANT CHANGE UP (ref 150–400)
PLATELET # BLD AUTO: 250 K/UL — SIGNIFICANT CHANGE UP (ref 150–400)
POTASSIUM SERPL-MCNC: 3.7 MMOL/L — SIGNIFICANT CHANGE UP (ref 3.5–5.3)
POTASSIUM SERPL-MCNC: 3.7 MMOL/L — SIGNIFICANT CHANGE UP (ref 3.5–5.3)
POTASSIUM SERPL-SCNC: 3.7 MMOL/L — SIGNIFICANT CHANGE UP (ref 3.5–5.3)
POTASSIUM SERPL-SCNC: 3.7 MMOL/L — SIGNIFICANT CHANGE UP (ref 3.5–5.3)
RBC # BLD: 5.04 M/UL — SIGNIFICANT CHANGE UP (ref 3.8–5.2)
RBC # BLD: 5.04 M/UL — SIGNIFICANT CHANGE UP (ref 3.8–5.2)
RBC # FLD: 15 % — HIGH (ref 10.3–14.5)
RBC # FLD: 15 % — HIGH (ref 10.3–14.5)
SODIUM SERPL-SCNC: 137 MMOL/L — SIGNIFICANT CHANGE UP (ref 135–145)
SODIUM SERPL-SCNC: 137 MMOL/L — SIGNIFICANT CHANGE UP (ref 135–145)
WBC # BLD: 11.16 K/UL — HIGH (ref 3.8–10.5)
WBC # BLD: 11.16 K/UL — HIGH (ref 3.8–10.5)
WBC # FLD AUTO: 11.16 K/UL — HIGH (ref 3.8–10.5)
WBC # FLD AUTO: 11.16 K/UL — HIGH (ref 3.8–10.5)

## 2024-01-08 PROCEDURE — 72148 MRI LUMBAR SPINE W/O DYE: CPT | Mod: 26

## 2024-01-08 PROCEDURE — 99233 SBSQ HOSP IP/OBS HIGH 50: CPT | Mod: GC

## 2024-01-08 RX ORDER — GABAPENTIN 400 MG/1
100 CAPSULE ORAL EVERY 12 HOURS
Refills: 0 | Status: DISCONTINUED | OUTPATIENT
Start: 2024-01-08 | End: 2024-01-10

## 2024-01-08 RX ORDER — AMLODIPINE BESYLATE 2.5 MG/1
1 TABLET ORAL
Refills: 0 | DISCHARGE

## 2024-01-08 RX ORDER — OMEPRAZOLE 10 MG/1
1 CAPSULE, DELAYED RELEASE ORAL
Refills: 0 | DISCHARGE

## 2024-01-08 RX ORDER — GABAPENTIN 400 MG/1
1 CAPSULE ORAL
Refills: 0 | DISCHARGE

## 2024-01-08 RX ORDER — POTASSIUM CHLORIDE 20 MEQ
20 PACKET (EA) ORAL ONCE
Refills: 0 | Status: COMPLETED | OUTPATIENT
Start: 2024-01-08 | End: 2024-01-08

## 2024-01-08 RX ORDER — PANTOPRAZOLE SODIUM 20 MG/1
40 TABLET, DELAYED RELEASE ORAL
Refills: 0 | Status: DISCONTINUED | OUTPATIENT
Start: 2024-01-08 | End: 2024-01-10

## 2024-01-08 RX ORDER — MELOXICAM 15 MG/1
1 TABLET ORAL
Refills: 0 | DISCHARGE

## 2024-01-08 RX ORDER — BUDESONIDE AND FORMOTEROL FUMARATE DIHYDRATE 160; 4.5 UG/1; UG/1
2 AEROSOL RESPIRATORY (INHALATION)
Refills: 0 | Status: DISCONTINUED | OUTPATIENT
Start: 2024-01-09 | End: 2024-01-10

## 2024-01-08 RX ORDER — ALENDRONATE SODIUM 70 MG/1
1 TABLET ORAL
Refills: 0 | DISCHARGE

## 2024-01-08 RX ORDER — BUDESONIDE AND FORMOTEROL FUMARATE DIHYDRATE 160; 4.5 UG/1; UG/1
2 AEROSOL RESPIRATORY (INHALATION)
Refills: 0 | DISCHARGE

## 2024-01-08 RX ADMIN — Medication 5 MILLIGRAM(S): at 09:59

## 2024-01-08 RX ADMIN — ENOXAPARIN SODIUM 40 MILLIGRAM(S): 100 INJECTION SUBCUTANEOUS at 05:32

## 2024-01-08 RX ADMIN — Medication 650 MILLIGRAM(S): at 10:59

## 2024-01-08 RX ADMIN — Medication 650 MILLIGRAM(S): at 09:59

## 2024-01-08 RX ADMIN — Medication 20 MILLIEQUIVALENT(S): at 09:59

## 2024-01-08 RX ADMIN — AMLODIPINE BESYLATE 10 MILLIGRAM(S): 2.5 TABLET ORAL at 05:32

## 2024-01-08 RX ADMIN — Medication 5 MILLIGRAM(S): at 22:27

## 2024-01-08 RX ADMIN — GABAPENTIN 100 MILLIGRAM(S): 400 CAPSULE ORAL at 19:33

## 2024-01-08 NOTE — PROGRESS NOTE ADULT - PROBLEM SELECTOR PLAN 2
Plan:  - C/w home amlodipine 10mg qd with holding parameters. - C/w home amlodipine 10mg qd with holding parameters. Plan:  - Continue home amlodipine 10mg qd with holding parameters

## 2024-01-08 NOTE — PROGRESS NOTE ADULT - PROBLEM SELECTOR PLAN 1
*P/w left buttock pain and muscle spasms radiating to left leg. She has had similar episodes in the past.   *CT L-spine shows:  No evidence of acute osseous fracture or jacy dislocation. Multilevel degenerative change of lumbar spine, most significantly at the L2-L3 level where there is moderate to severe narrowing of the spinal canal. In the setting of neuropathy, recommend MRI of the lumbar spine  for further evaluation. No s/s of saddle anesthesia, bowel/bladder incontinence. Walks independently.  *S/p tramadol, morphine 2mg x1, methocarbamol 750mg x1, ketorolac 15mg x1, and valium 5mg x1.  *Neuro exam: MSK 5/5, sensation intact  * - straight leg raise test (1/8/24)  Plan:  - PT eval - [facility]  - Pain control; tylenol for mild, lidocaine patch to affected area  - MR L-spine, to be done outpatient *P/w left buttock pain and muscle spasms radiating to left leg. She has had similar episodes in the past.   *CT L-spine shows:  No evidence of acute osseous fracture or jacy dislocation. Multilevel degenerative change of lumbar spine, most significantly at the L2-L3 level where there is moderate to severe narrowing of the spinal canal. In the setting of neuropathy, recommend MRI of the lumbar spine  for further evaluation. No s/s of saddle anesthesia, bowel/bladder incontinence. Walks independently.  *S/p tramadol, morphine 2mg x1, methocarbamol 750mg x1, ketorolac 15mg x1, and valium 5mg x1.  *Neuro exam: MSK 5/5, sensation intact    Plan:  - PT eval - discharge to [facility]  - Pain control; tylenol for mild, lidocaine patch to affected area  - MR L-spine, to be done outpatient *P/w left buttock pain and muscle spasms radiating to left leg. She has had similar episodes in the past.   *CT L-spine shows:  No evidence of acute osseous fracture or jacy dislocation. Multilevel degenerative change of lumbar spine, most significantly at the L2-L3 level where there is moderate to severe narrowing of the spinal canal. In the setting of neuropathy, recommend MRI of the lumbar spine  for further evaluation. No s/s of saddle anesthesia, bowel/bladder incontinence. Walks independently.  *S/p tramadol, morphine 2mg x1, methocarbamol 750mg x1, ketorolac 15mg x1, and valium 5mg x1.  *Neuro exam: MSK 5/5, sensation intact    Plan:  - PT eval - discharge to [facility]  - Pain control; tylenol for mild, lidocaine patch to affected area  - MR L-spine, to be done outpatient *P/w left buttock pain and muscle spasms radiating to left leg. She has had similar episodes in the past.   *CT L-spine shows:  No evidence of acute osseous fracture or jacy dislocation. Multilevel degenerative change of lumbar spine, most significantly at the L2-L3 level where there is moderate to severe narrowing of the spinal canal. In the setting of neuropathy, recommend MRI of the lumbar spine  for further evaluation. No s/s of saddle anesthesia, bowel/bladder incontinence. Walks independently.  *S/p tramadol, morphine 2mg x1, methocarbamol 750mg x1, ketorolac 15mg x1, and valium 5mg x1.  *Neuro exam: MSK 5/5, sensation intact    Plan:  - PT eval - discharge to [facility]  - Pain control; Tylenol for mild, lidocaine patch to affected area  - Restart home gabapentin 100mg BID,  - MR L-spine, to be done outpatient *P/w left buttock pain and muscle spasms radiating to left leg. She has had similar episodes in the past.   *CT L-spine shows:  No evidence of acute osseous fracture or jacy dislocation. Multilevel degenerative change of lumbar spine, most significantly at the L2-L3 level where there is moderate to severe narrowing of the spinal canal. In the setting of neuropathy, recommend MRI of the lumbar spine  for further evaluation. No s/s of saddle anesthesia, bowel/bladder incontinence. Walks independently.  *S/p tramadol, morphine 2mg x1, methocarbamol 750mg x1, ketorolac 15mg x1, and valium 5mg x1.  *Neuro exam: MSK 5/5, sensation intact    Plan:  - PT eval - discharge to [facility]  - Pain control; Tylenol for mild, lidocaine patch to affected area  - Restart home gabapentin 100mg BID,  - MR L-spine, to be done outpatient

## 2024-01-08 NOTE — PROGRESS NOTE ADULT - PROBLEM SELECTOR PLAN 3
*History of L breast cancer (2005) in remission  *F/w Dr. Donaldson (sp?). Had a recent follow up with oncologist in October.  Plan:  - Ctm. *History of L breast cancer (2005) in remission  *F/w Dr. Donaldson (sp?). Had a recent follow up with oncologist in October.    Plan:  - Ctm. History of L breast cancer (2005) in remission. F/w Dr. Donaldson (sp?). Had a recent follow up with oncologist in October.    Plan:  - CTM

## 2024-01-08 NOTE — DISCHARGE NOTE PROVIDER - NSDCFUSCHEDAPPT_GEN_ALL_CORE_FT
Chuck Shah  [facility]  ORTHOSURG [address]  Scheduled Appointment: 01/17/2024     Chuck Shah  [facility]  ORTHOSURG [address]  Scheduled Appointment: 01/17/2024     [facility]  ORTHOSURG [address] [facility]  Scheduled Appointment: 01/16/2024     [facility]  ORTHOSURG 5 [facility]  Scheduled Appointment: 01/16/2024

## 2024-01-08 NOTE — DISCHARGE NOTE PROVIDER - NSDCCPTREATMENT_GEN_ALL_CORE_FT
PRINCIPAL PROCEDURE  Procedure: MR lumbar spine wo con  Findings and Treatment: LOCALIZER: No additional findings.  BONES: There is no fracture or bone marrow edema. Multilevel Schmorl's   nodes are noted.  DISCS: There is multilevel disc desiccation. There are bridging   osteophytes throughout the lumbar spine.  ALIGNMENT: The alignment is normal.  SACROILIAC JOINTS/SACRUM: There is no sacral fracture. The SI joints are   partially visualized but are intact.  CONUS AND CAUDA EQUINA: The distal cord and conus are normal in signal.   Conus terminates at L1.  VISUALIZED INTRAPELVIC/INTRA-ABDOMINAL SOFT TISSUES: Normal.  PARASPINAL SOFT TISSUES: Normal.  INDIVIDUAL LEVELS:  LOWER THORACIC SPINE: No spinal canal or neuroforaminal stenosis.  L1-L2: No spinal canal or neuroforaminal stenosis.  L2-L3: There is a disc bulge with a superimposed left   paracentral/subarticular disc herniation along with facet hypertrophy   resulting in moderate to severe spinal canal stenosis and left lateral   recess narrowing with mass effect on the descending left greater than   right nerve roots. There is mild to moderate bilateral neural foraminal   narrowing.  L3-L4: There is a disc bulge with facet hypertrophy resulting in mild   spinal canal stenosis with moderate left and mild to moderate right   neural foraminal narrowing  L4-L5: There is a disc bulge with facet and ligamentum flavum hypertrophy   resulting in mild to moderate spinal canal stenosis with moderate to   severe left and moderate right neural foraminal narrowing  L5-S1: There is a disc bulge asymmetric to the right along with facet   hypertrophy resulting in mild to moderate right and mild left neural   foraminal narrowing.  IMPRESSION:  L2-L3 moderate to severe spinal canal stenosis and left lateral recess   narrowing with mass effect on the left greater than the right descending   nerve roots.  L4-L5 moderate to severe left and moderate right neural foraminal   narrowing with mild to moderate spinal canal stenosis.

## 2024-01-08 NOTE — PROGRESS NOTE ADULT - SUBJECTIVE AND OBJECTIVE BOX
OVERNIGHT EVENTS:    SUBJECTIVE / INTERVAL HPI: Patient seen and examined at bedside.     VITAL SIGNS:  Vital Signs Last 24 Hrs  T(C): 37 (08 Jan 2024 04:34), Max: 37.1 (07 Jan 2024 11:42)  T(F): 98.6 (08 Jan 2024 04:34), Max: 98.7 (07 Jan 2024 11:42)  HR: 72 (08 Jan 2024 04:34) (70 - 91)  BP: 135/74 (08 Jan 2024 04:34) (135/74 - 179/72)  BP(mean): --  RR: 18 (08 Jan 2024 04:34) (16 - 18)  SpO2: 94% (08 Jan 2024 04:34) (94% - 100%)    Parameters below as of 08 Jan 2024 04:34  Patient On (Oxygen Delivery Method): room air      I&O's Summary    07 Jan 2024 07:01  -  08 Jan 2024 07:00  --------------------------------------------------------  IN: 0 mL / OUT: 400 mL / NET: -400 mL        PHYSICAL EXAM:    General: WDWN  HEENT: NC/AT; PERRL, anicteric sclera; MMM  Neck: supple  Cardiovascular: +S1/S2; RRR  Respiratory: CTA B/L; no W/R/R  Gastrointestinal: soft, NT/ND; +BSx4  Extremities: WWP; no edema, clubbing or cyanosis  Vascular: 2+ radial, DP/PT pulses B/L  Neurological: AAOx3; no focal deficits    MEDICATIONS:  MEDICATIONS  (STANDING):  amLODIPine   Tablet 10 milliGRAM(s) Oral daily  enoxaparin Injectable 40 milliGRAM(s) SubCutaneous every 24 hours  potassium chloride    Tablet ER 20 milliEquivalent(s) Oral once    MEDICATIONS  (PRN):  acetaminophen     Tablet .. 650 milliGRAM(s) Oral every 6 hours PRN Temp greater or equal to 38C (100.4F), Mild Pain (1 - 3)  aluminum hydroxide/magnesium hydroxide/simethicone Suspension 30 milliLiter(s) Oral every 4 hours PRN Dyspepsia  baclofen 5 milliGRAM(s) Oral every 12 hours PRN Muscle Spasm  melatonin 3 milliGRAM(s) Oral at bedtime PRN Insomnia  ondansetron Injectable 4 milliGRAM(s) IV Push every 8 hours PRN Nausea and/or Vomiting      ALLERGIES:  Allergies    No Known Allergies    Intolerances        LABS:                        13.9   11.16 )-----------( 250      ( 08 Jan 2024 05:30 )             42.7     01-08    137  |  100  |  12  ----------------------------<  93  3.7   |  28  |  0.87    Ca    9.4      08 Jan 2024 05:30  Phos  3.4     01-08  Mg     2.2     01-08        Urinalysis Basic - ( 08 Jan 2024 05:30 )    Color: x / Appearance: x / SG: x / pH: x  Gluc: 93 mg/dL / Ketone: x  / Bili: x / Urobili: x   Blood: x / Protein: x / Nitrite: x   Leuk Esterase: x / RBC: x / WBC x   Sq Epi: x / Non Sq Epi: x / Bacteria: x      CAPILLARY BLOOD GLUCOSE          RADIOLOGY & ADDITIONAL TESTS: Reviewed.   OVERNIGHT EVENTS: NAEO.    SUBJECTIVE / INTERVAL HPI: Patient seen and examined at bedside. Patient denies any new complaints, admits to continued L LE inguinal and lateral thigh pain, worse with movement. Denies saddle anesthesia, urinary/fecal incontinence, loss of strength, fever/chills, n/v, dysuria.    VITAL SIGNS:  Vital Signs Last 24 Hrs  T(C): 37 (08 Jan 2024 04:34), Max: 37.1 (07 Jan 2024 11:42)  T(F): 98.6 (08 Jan 2024 04:34), Max: 98.7 (07 Jan 2024 11:42)  HR: 72 (08 Jan 2024 04:34) (70 - 91)  BP: 135/74 (08 Jan 2024 04:34) (135/74 - 179/72)  BP(mean): --  RR: 18 (08 Jan 2024 04:34) (16 - 18)  SpO2: 94% (08 Jan 2024 04:34) (94% - 100%)    Parameters below as of 08 Jan 2024 04:34  Patient On (Oxygen Delivery Method): room air      I&O's Summary    07 Jan 2024 07:01  -  08 Jan 2024 07:00  --------------------------------------------------------  IN: 0 mL / OUT: 400 mL / NET: -400 mL        PHYSICAL EXAM:    CONSTITUTIONAL: No fevers, chills, sweats, weakness, fatigue, or weight changes  HEENT: No visual or hearing changes;  No vertigo or throat pain   NECK: No pain or stiffness  RESPIRATORY: No cough, wheezing, hemoptysis; No shortness of breath  CARDIOVASCULAR: No chest pain or palpitations  GASTROINTESTINAL: No abdominal pain. No nausea, vomiting, diarrhea, or constipation. No melena. Mild suprapubic tenderness to palpation.  GENITOURINARY: No dysuria, frequency or hematuria  NEUROLOGICAL: No numbness or weakness. 5/5 strength in b/l UE and LE  SKIN: No itching, burning, rashes, or lesions   HEME: No bruising or bleeding  ENDO: No hypo-/hyper- thermia  MSK: +back pain with radiation down L groin and lateral thigh. - SLT, pain with lift that does not extend past knee. No spinal tenderness to palpation.  All other review of systems as per HPI.    MEDICATIONS:  MEDICATIONS  (STANDING):  amLODIPine   Tablet 10 milliGRAM(s) Oral daily  enoxaparin Injectable 40 milliGRAM(s) SubCutaneous every 24 hours  potassium chloride    Tablet ER 20 milliEquivalent(s) Oral once    MEDICATIONS  (PRN):  acetaminophen     Tablet .. 650 milliGRAM(s) Oral every 6 hours PRN Temp greater or equal to 38C (100.4F), Mild Pain (1 - 3)  aluminum hydroxide/magnesium hydroxide/simethicone Suspension 30 milliLiter(s) Oral every 4 hours PRN Dyspepsia  baclofen 5 milliGRAM(s) Oral every 12 hours PRN Muscle Spasm  melatonin 3 milliGRAM(s) Oral at bedtime PRN Insomnia  ondansetron Injectable 4 milliGRAM(s) IV Push every 8 hours PRN Nausea and/or Vomiting      ALLERGIES:  Allergies    No Known Allergies    Intolerances        LABS:                        13.9   11.16 )-----------( 250      ( 08 Jan 2024 05:30 )             42.7     01-08    137  |  100  |  12  ----------------------------<  93  3.7   |  28  |  0.87    Ca    9.4      08 Jan 2024 05:30  Phos  3.4     01-08  Mg     2.2     01-08        Urinalysis Basic - ( 08 Jan 2024 05:30 )    Color: x / Appearance: x / SG: x / pH: x  Gluc: 93 mg/dL / Ketone: x  / Bili: x / Urobili: x   Blood: x / Protein: x / Nitrite: x   Leuk Esterase: x / RBC: x / WBC x   Sq Epi: x / Non Sq Epi: x / Bacteria: x      CAPILLARY BLOOD GLUCOSE          RADIOLOGY & ADDITIONAL TESTS: Reviewed.   OVERNIGHT EVENTS: NAEO.    SUBJECTIVE / INTERVAL HPI: Patient seen and examined at bedside. Patient denies any new complaints, admits to continued L LE inguinal and lateral thigh pain, worse with movement. Denies saddle anesthesia, urinary/fecal incontinence, loss of strength, fever/chills, n/v, dysuria.    VITAL SIGNS:  Vital Signs Last 24 Hrs  T(C): 37 (08 Jan 2024 04:34), Max: 37.1 (07 Jan 2024 11:42)  T(F): 98.6 (08 Jan 2024 04:34), Max: 98.7 (07 Jan 2024 11:42)  HR: 72 (08 Jan 2024 04:34) (70 - 91)  BP: 135/74 (08 Jan 2024 04:34) (135/74 - 179/72)  BP(mean): --  RR: 18 (08 Jan 2024 04:34) (16 - 18)  SpO2: 94% (08 Jan 2024 04:34) (94% - 100%)    Parameters below as of 08 Jan 2024 04:34  Patient On (Oxygen Delivery Method): room air      I&O's Summary    07 Jan 2024 07:01  -  08 Jan 2024 07:00  --------------------------------------------------------  IN: 0 mL / OUT: 400 mL / NET: -400 mL        PHYSICAL EXAM:    CONSTITUTIONAL: No fevers, chills, sweats, weakness, fatigue, or weight changes  HEENT: No visual or hearing changes;  No vertigo or throat pain   NECK: No pain or stiffness  RESPIRATORY: No cough, wheezing, hemoptysis; No shortness of breath  CARDIOVASCULAR: No chest pain or palpitations  GASTROINTESTINAL: No abdominal pain. No nausea, vomiting, diarrhea, or constipation. No melena. Mild suprapubic tenderness to palpation.  GENITOURINARY: No dysuria, frequency or hematuria  NEUROLOGICAL: No numbness or weakness. 5/5 strength in b/l UE and LE  SKIN: No itching, burning, rashes, or lesions   HEME: No bruising or bleeding  ENDO: No hypo-/hyper- thermia  MSK: +back pain with radiation down L groin and lateral thigh. Pain worsens with leg lift, but pain does not extend past knee. No spinal tenderness to palpation.  All other review of systems as per HPI.    MEDICATIONS:  MEDICATIONS  (STANDING):  amLODIPine   Tablet 10 milliGRAM(s) Oral daily  enoxaparin Injectable 40 milliGRAM(s) SubCutaneous every 24 hours  potassium chloride    Tablet ER 20 milliEquivalent(s) Oral once    MEDICATIONS  (PRN):  acetaminophen     Tablet .. 650 milliGRAM(s) Oral every 6 hours PRN Temp greater or equal to 38C (100.4F), Mild Pain (1 - 3)  aluminum hydroxide/magnesium hydroxide/simethicone Suspension 30 milliLiter(s) Oral every 4 hours PRN Dyspepsia  baclofen 5 milliGRAM(s) Oral every 12 hours PRN Muscle Spasm  melatonin 3 milliGRAM(s) Oral at bedtime PRN Insomnia  ondansetron Injectable 4 milliGRAM(s) IV Push every 8 hours PRN Nausea and/or Vomiting      ALLERGIES:  Allergies    No Known Allergies    Intolerances        LABS:                        13.9   11.16 )-----------( 250      ( 08 Jan 2024 05:30 )             42.7     01-08    137  |  100  |  12  ----------------------------<  93  3.7   |  28  |  0.87    Ca    9.4      08 Jan 2024 05:30  Phos  3.4     01-08  Mg     2.2     01-08        Urinalysis Basic - ( 08 Jan 2024 05:30 )    Color: x / Appearance: x / SG: x / pH: x  Gluc: 93 mg/dL / Ketone: x  / Bili: x / Urobili: x   Blood: x / Protein: x / Nitrite: x   Leuk Esterase: x / RBC: x / WBC x   Sq Epi: x / Non Sq Epi: x / Bacteria: x      CAPILLARY BLOOD GLUCOSE          RADIOLOGY & ADDITIONAL TESTS: Reviewed.   OVERNIGHT EVENTS: NEAL    SUBJECTIVE / INTERVAL HPI: Patient seen and examined at bedside. Patient denies any new complaints, admits to continued L LE inguinal and lateral thigh pain, worse with movement. Denies saddle anesthesia, urinary/fecal incontinence, loss of strength, fever/chills, n/v, dysuria.    VITAL SIGNS:  Vital Signs Last 24 Hrs  T(C): 37 (08 Jan 2024 04:34), Max: 37.1 (07 Jan 2024 11:42)  T(F): 98.6 (08 Jan 2024 04:34), Max: 98.7 (07 Jan 2024 11:42)  HR: 72 (08 Jan 2024 04:34) (70 - 91)  BP: 135/74 (08 Jan 2024 04:34) (135/74 - 179/72)  BP(mean): --  RR: 18 (08 Jan 2024 04:34) (16 - 18)  SpO2: 94% (08 Jan 2024 04:34) (94% - 100%)    Parameters below as of 08 Jan 2024 04:34  Patient On (Oxygen Delivery Method): room air      I&O's Summary    07 Jan 2024 07:01  -  08 Jan 2024 07:00  --------------------------------------------------------  IN: 0 mL / OUT: 400 mL / NET: -400 mL        PHYSICAL EXAM:    CONSTITUTIONAL: No fevers, chills, sweats, weakness, fatigue, or weight changes  HEENT: No visual or hearing changes;  No vertigo or throat pain   NECK: No pain or stiffness  RESPIRATORY: No cough, wheezing, hemoptysis; No shortness of breath  CARDIOVASCULAR: No chest pain or palpitations  GASTROINTESTINAL: No abdominal pain. No nausea, vomiting, diarrhea, or constipation. No melena. Mild suprapubic tenderness to palpation.  GENITOURINARY: No dysuria, frequency or hematuria  NEUROLOGICAL: No numbness or weakness. 5/5 strength in b/l UE and LE  SKIN: No itching, burning, rashes, or lesions   HEME: No bruising or bleeding  ENDO: No hypo-/hyper- thermia  MSK: +back pain with radiation down L groin and lateral thigh. Pain worsens with leg lift, but pain does not extend past knee. No spinal tenderness to palpation.  All other review of systems as per HPI.    MEDICATIONS:  MEDICATIONS  (STANDING):  amLODIPine   Tablet 10 milliGRAM(s) Oral daily  enoxaparin Injectable 40 milliGRAM(s) SubCutaneous every 24 hours  potassium chloride    Tablet ER 20 milliEquivalent(s) Oral once    MEDICATIONS  (PRN):  acetaminophen     Tablet .. 650 milliGRAM(s) Oral every 6 hours PRN Temp greater or equal to 38C (100.4F), Mild Pain (1 - 3)  aluminum hydroxide/magnesium hydroxide/simethicone Suspension 30 milliLiter(s) Oral every 4 hours PRN Dyspepsia  baclofen 5 milliGRAM(s) Oral every 12 hours PRN Muscle Spasm  melatonin 3 milliGRAM(s) Oral at bedtime PRN Insomnia  ondansetron Injectable 4 milliGRAM(s) IV Push every 8 hours PRN Nausea and/or Vomiting      ALLERGIES:  Allergies    No Known Allergies    Intolerances        LABS:                        13.9   11.16 )-----------( 250      ( 08 Jan 2024 05:30 )             42.7     01-08    137  |  100  |  12  ----------------------------<  93  3.7   |  28  |  0.87    Ca    9.4      08 Jan 2024 05:30  Phos  3.4     01-08  Mg     2.2     01-08        Urinalysis Basic - ( 08 Jan 2024 05:30 )    Color: x / Appearance: x / SG: x / pH: x  Gluc: 93 mg/dL / Ketone: x  / Bili: x / Urobili: x   Blood: x / Protein: x / Nitrite: x   Leuk Esterase: x / RBC: x / WBC x   Sq Epi: x / Non Sq Epi: x / Bacteria: x      CAPILLARY BLOOD GLUCOSE          RADIOLOGY & ADDITIONAL TESTS: Reviewed.

## 2024-01-08 NOTE — PROGRESS NOTE ADULT - PROBLEM SELECTOR PLAN 4
F: NI  E: Replete as needed  N: Regular diet  Dvt ppx: Lovenox  GI ppx: NI  Code status: FULL code F: NI  E: Replete as needed  N: Regular diet  DVT PPx: Lovenox  GI: Pantoprazole 40mg qd  Code status: FULL code

## 2024-01-08 NOTE — DISCHARGE NOTE PROVIDER - ATTENDING DISCHARGE PHYSICAL EXAMINATION:
MR L-spine (1/9/24) revealed L2-L3 moderate to severe spinal canal stenosis and left lateral recess narrowing with mass effect on the left greater than the right descending nerve roots; L4-L5 moderate to severe left and moderate right neural foraminal narrowing with mild to moderate spinal canal stenosis. ortho consulted - plan for outpt for and rehab -  Pt was seen by PT again and is able to ambulate well -- will go home w home PT and fu ortho in 2 weeks   will discharge home with flexeril

## 2024-01-08 NOTE — DISCHARGE NOTE PROVIDER - NSDCFUADDAPPT_GEN_ALL_CORE_FT
Dr. Jiang on January 16th, 2:15pm at [address] [place] [address] [address], [place] [address] Dr. Jiang on January 16th, 2:15pm at [address] [place] [address] [address], [place] [address]

## 2024-01-08 NOTE — DISCHARGE NOTE PROVIDER - NSDCMRMEDTOKEN_GEN_ALL_CORE_FT
amLODIPine 10 mg oral tablet: 1 tab(s) orally once a day  baclofen 10 mg oral tablet: 0.5 tab(s) orally every 12 hours  budesonide-formoterol 80 mcg-4.5 mcg/inh inhalation aerosol: 2 puff(s) inhaled 2 times a day  Fosamax 70 mg oral tablet: 1 tab(s) orally once a week  gabapentin 100 mg oral tablet: 1 tab(s) orally every 12 hours  meloxicam 15 mg oral tablet: 1 tab(s) orally once a day  omeprazole 20 mg oral delayed release capsule: 1 cap(s) orally once a day (in the morning)   amLODIPine 10 mg oral tablet: 1 tab(s) orally once a day  budesonide-formoterol 80 mcg-4.5 mcg/inh inhalation aerosol: 2 puff(s) inhaled 2 times a day  cyclobenzaprine 5 mg oral tablet: 1 tab(s) orally 3 times a day as needed for Muscle Spasm  Fosamax 70 mg oral tablet: 1 tab(s) orally once a week  gabapentin 100 mg oral tablet: 1 tab(s) orally every 12 hours  meloxicam 15 mg oral tablet: 1 tab(s) orally once a day  omeprazole 20 mg oral delayed release capsule: 1 cap(s) orally once a day (in the morning)

## 2024-01-08 NOTE — DISCHARGE NOTE PROVIDER - HOSPITAL COURSE
#Discharge: do not delete    Patient is __ yo M/F with past medical history of _____, presented with _____, found to have _____      Problem List/Main Diagnoses:     New medications/therapies:   New lines/hardware:  Labs to be followed outpatient:   Exam to be followed outpatient:     Discharge plan: discharge to ______    Physical Exam Upon Discharge:     #Discharge: do not delete    This is a 74 y/o f hx HTN, sciatica, breast CA (remission) presents c/o left buttock and L groin pain and muscle spasms radiating to left leg today, admitted for further mgmt and PT eval.    Problem List/Main Diagnoses:     # Lumbar radiculopathy  *P/w  left buttock pain and muscle spasms radiating to left leg. She has had similar episodes in the past.   *CT L-spine shows:  No evidence of acute osseous fracture or jacy dislocation. Multilevel degenerative change of lumbar spine, most significantly at the L2-L3 level where there is moderate to severe narrowing of the spinal canal. In the setting of neuropathy, recommend MRI of the lumbar spine  for further evaluation. Likely ddx spinal stenosis, piriformis syndrome given gluteal location and muscle spasms, disc herniation. No s/s of saddle anesthesia, bowel/bladder incontinence. Walks independently.  *S/p tramadol, morphine 2mg x1, methocarbamol 750mg x1, ketorolac 15mg x1, and valium 5mg x1.  *Neuro exam: MSK 5/5, sensation intact, +straight leg raise test  Plan:  - PT eval - [facility]  - Pain control; tylenol for mild, lidocaine patch to affected area  - MR L-spine, can be done outpatient    # Hypertension  Plan:  - C/w home amlodipine 10mg qd with holding parameters.    # History of breast cancer  *History of L breast cancer (2005) in remission  *F/w Dr. Donaldson (sp?). Had a recent follow up with oncologist in October.  Plan:  - Ctm.    #Need for prophylactic measure  F: NI  E: Replete as needed  N: Regular diet  Dvt ppx: Lovenox  GI ppx: NI  Code status: FULL code    New medications/therapies:   New lines/hardware:  Labs to be followed outpatient:   Exam to be followed outpatient:     Discharge plan: discharge to ______    Physical Exam Upon Discharge:  CONSTITUTIONAL: No fevers, chills, sweats, weakness, fatigue, or weight changes  HEENT: No visual or hearing changes;  No vertigo or throat pain   NECK: No pain or stiffness  RESPIRATORY: No cough, wheezing, hemoptysis; No shortness of breath  CARDIOVASCULAR: No chest pain or palpitations  GASTROINTESTINAL: No abdominal pain. No nausea, vomiting, diarrhea, or constipation. No melena. Mild suprapubic tenderness to palpation.  GENITOURINARY: No dysuria, frequency or hematuria  NEUROLOGICAL: No numbness or weakness. 5/5 strength in b/l UE and LE  SKIN: No itching, burning, rashes, or lesions   HEME: No bruising or bleeding  ENDO: No hypo-/hyper- thermia  MSK: +back pain with radiation down L groin and lateral thigh. - SLT, pain with leg lift that does not extend past knee.  All other review of systems as per HPI. #Discharge: do not delete    This is a 74 y/o f hx HTN, sciatica, breast CA (remission) presents c/o left buttock and L groin pain and muscle spasms radiating to left leg today, admitted for further mgmt and PT eval.    Problem List/Main Diagnoses:     # Lumbar radiculopathy  *P/w  left buttock pain and muscle spasms radiating to left leg. She has had similar episodes in the past.   *CT L-spine shows:  No evidence of acute osseous fracture or jacy dislocation. Multilevel degenerative change of lumbar spine, most significantly at the L2-L3 level where there is moderate to severe narrowing of the spinal canal. In the setting of neuropathy, recommend MRI of the lumbar spine  for further evaluation. Likely ddx spinal stenosis, piriformis syndrome given gluteal location and muscle spasms, disc herniation. No s/s of saddle anesthesia, bowel/bladder incontinence. Walks independently.  *S/p tramadol, morphine 2mg x1, methocarbamol 750mg x1, ketorolac 15mg x1, and valium 5mg x1.  *Neuro exam: MSK 5/5, sensation intact, +straight leg raise test  Plan:  - PT eval - [facility]  - Pain control; tylenol for mild, lidocaine patch to affected area  - MR L-spine, to be done outpatient    # Hypertension  Plan:  - C/w home amlodipine 10mg qd with holding parameters.    # History of breast cancer  *History of L breast cancer (2005) in remission  *F/w Dr. Donaldson (sp?). Had a recent follow up with oncologist in October.  Plan:  - Ctm.    #Need for prophylactic measure  F: NI  E: Replete as needed  N: Regular diet  Dvt ppx: Lovenox  GI ppx: NI  Code status: FULL code    New medications/therapies:   New lines/hardware:  Labs to be followed outpatient:   Exam to be followed outpatient:     Discharge plan: discharge to [facility]    Physical Exam Upon Discharge:  CONSTITUTIONAL: No fevers, chills, sweats, weakness, fatigue, or weight changes  HEENT: No visual or hearing changes;  No vertigo or throat pain   NECK: No pain or stiffness  RESPIRATORY: No cough, wheezing, hemoptysis; No shortness of breath  CARDIOVASCULAR: No chest pain or palpitations  GASTROINTESTINAL: No abdominal pain. No nausea, vomiting, diarrhea, or constipation. No melena. Mild suprapubic tenderness to palpation.  GENITOURINARY: No dysuria, frequency or hematuria  NEUROLOGICAL: No numbness or weakness. 5/5 strength in b/l UE and LE  SKIN: No itching, burning, rashes, or lesions   HEME: No bruising or bleeding  ENDO: No hypo-/hyper- thermia  MSK: +back pain with radiation down L groin and lateral thigh. - SLT, pain with leg lift that does not extend past knee.  All other review of systems as per HPI. #Discharge: do not delete    This is a 72 y/o f hx HTN, sciatica, breast CA (remission) presents c/o left buttock and L groin pain and muscle spasms radiating to left leg today, admitted for further mgmt and PT eval.    Problem List/Main Diagnoses:     # Lumbar radiculopathy  *P/w  left buttock pain and muscle spasms radiating to left leg. She has had similar episodes in the past.   *CT L-spine shows:  No evidence of acute osseous fracture or jacy dislocation. Multilevel degenerative change of lumbar spine, most significantly at the L2-L3 level where there is moderate to severe narrowing of the spinal canal. In the setting of neuropathy, recommend MRI of the lumbar spine  for further evaluation. Likely ddx spinal stenosis, piriformis syndrome given gluteal location and muscle spasms, disc herniation. No s/s of saddle anesthesia, bowel/bladder incontinence. Walks independently.  *S/p tramadol, morphine 2mg x1, methocarbamol 750mg x1, ketorolac 15mg x1, and valium 5mg x1.  *Neuro exam: MSK 5/5, sensation intact, +straight leg raise test  Plan:  - PT eval - [facility]  - Pain control; tylenol for mild, lidocaine patch to affected area  - MR L-spine, to be done outpatient    # Hypertension  Plan:  - C/w home amlodipine 10mg qd with holding parameters.    # History of breast cancer  *History of L breast cancer (2005) in remission  *F/w Dr. Donaldson (sp?). Had a recent follow up with oncologist in October.  Plan:  - Ctm.    #Need for prophylactic measure  F: NI  E: Replete as needed  N: Regular diet  Dvt ppx: Lovenox  GI ppx: NI  Code status: FULL code    New medications/therapies:   New lines/hardware:  Labs to be followed outpatient:   Exam to be followed outpatient:     Discharge plan: discharge to [facility]    Physical Exam Upon Discharge:  CONSTITUTIONAL: No fevers, chills, sweats, weakness, fatigue, or weight changes  HEENT: No visual or hearing changes;  No vertigo or throat pain   NECK: No pain or stiffness  RESPIRATORY: No cough, wheezing, hemoptysis; No shortness of breath  CARDIOVASCULAR: No chest pain or palpitations  GASTROINTESTINAL: No abdominal pain. No nausea, vomiting, diarrhea, or constipation. No melena. Mild suprapubic tenderness to palpation.  GENITOURINARY: No dysuria, frequency or hematuria  NEUROLOGICAL: No numbness or weakness. 5/5 strength in b/l UE and LE  SKIN: No itching, burning, rashes, or lesions   HEME: No bruising or bleeding  ENDO: No hypo-/hyper- thermia  MSK: +back pain with radiation down L groin and lateral thigh. - SLT, pain with leg lift that does not extend past knee.  All other review of systems as per HPI. #Discharge: do not delete    This is a 72 y/o f hx HTN, sciatica, breast CA (remission) presents c/o left buttock and L groin pain and muscle spasms radiating to left leg today, admitted for further mgmt and PT eval.    Problem List/Main Diagnoses:     # Lumbar radiculopathy  *P/w  left buttock pain and muscle spasms radiating to left leg. She has had similar episodes in the past.   *CT L-spine shows:  No evidence of acute osseous fracture or jacy dislocation. Multilevel degenerative change of lumbar spine, most significantly at the L2-L3 level where there is moderate to severe narrowing of the spinal canal. In the setting of neuropathy, recommend MRI of the lumbar spine  for further evaluation. Likely ddx spinal stenosis, piriformis syndrome given gluteal location and muscle spasms, disc herniation. No s/s of saddle anesthesia, bowel/bladder incontinence. Walks independently.  *S/p tramadol, morphine 2mg x1, methocarbamol 750mg x1, ketorolac 15mg x1, and valium 5mg x1.  *Neuro exam: MSK 5/5, sensation intact, +straight leg raise test  Plan:  - PT eval - [facility]  - Pain control; tylenol for mild, lidocaine patch to affected area  - MR L-spine, to be done outpatient    # Hypertension  Plan:  - C/w home amlodipine 10mg qd with holding parameters.    # History of breast cancer  *History of L breast cancer (2005) in remission  *F/w Dr. Donaldson (sp?). Had a recent follow up with oncologist in October.  Plan:  - Ctm.    #Need for prophylactic measure  F: NI  E: Replete as needed  N: Regular diet  Dvt ppx: Lovenox  GI ppx: NI  Code status: FULL code    New medications/therapies:   New lines/hardware:  Labs to be followed outpatient:   Exam to be followed outpatient:     Discharge plan: discharge to [facility]    Physical Exam Upon Discharge:  CONSTITUTIONAL: No fevers, chills, sweats, weakness, fatigue, or weight changes  HEENT: No visual or hearing changes;  No vertigo or throat pain   NECK: No pain or stiffness  RESPIRATORY: No cough, wheezing, hemoptysis; No shortness of breath  CARDIOVASCULAR: No chest pain or palpitations  GASTROINTESTINAL: No abdominal pain. No nausea, vomiting, diarrhea, or constipation. No melena. Mild suprapubic tenderness to palpation.  GENITOURINARY: No dysuria, frequency or hematuria  NEUROLOGICAL: No numbness or weakness. 5/5 strength in b/l UE and LE  SKIN: No itching, burning, rashes, or lesions   HEME: No bruising or bleeding  ENDO: No hypo-/hyper- thermia  MSK: +back pain with radiation down L groin and lateral thigh. Pain worsens with straight leg lift, pain does not extend past knee.  All other review of systems as per HPI. #Discharge: do not delete    This is a 72 y/o f hx HTN, sciatica, breast CA (remission) presents c/o left buttock and L groin pain and muscle spasms radiating to left leg today, admitted for further mgmt and PT eval.    Problem List/Main Diagnoses:     # Lumbar radiculopathy  *P/w  left buttock pain and muscle spasms radiating to left leg. She has had similar episodes in the past.   *CT L-spine shows:  No evidence of acute osseous fracture or jacy dislocation. Multilevel degenerative change of lumbar spine, most significantly at the L2-L3 level where there is moderate to severe narrowing of the spinal canal. In the setting of neuropathy, recommend MRI of the lumbar spine  for further evaluation. Likely ddx spinal stenosis, piriformis syndrome given gluteal location and muscle spasms, disc herniation. No s/s of saddle anesthesia, bowel/bladder incontinence. Walks independently.  *S/p tramadol, morphine 2mg x1, methocarbamol 750mg x1, ketorolac 15mg x1, and valium 5mg x1.  *Neuro exam: MSK 5/5, sensation intact, +straight leg raise test  Plan:  - PT eval - [facility]  - Pain control; tylenol for mild, lidocaine patch to affected area  - MR L-spine, to be done outpatient    # Hypertension  Plan:  - C/w home amlodipine 10mg qd with holding parameters.    # History of breast cancer  *History of L breast cancer (2005) in remission  *F/w Dr. Donaldson (sp?). Had a recent follow up with oncologist in October.  Plan:  - Ctm.    #Need for prophylactic measure  F: NI  E: Replete as needed  N: Regular diet  Dvt ppx: Lovenox  GI ppx: NI  Code status: FULL code    New medications/therapies:   New lines/hardware:  Labs to be followed outpatient:   Exam to be followed outpatient:     Discharge plan: discharge to [facility]    Physical Exam Upon Discharge:  CONSTITUTIONAL: No fevers, chills, sweats, weakness, fatigue, or weight changes  HEENT: No visual or hearing changes;  No vertigo or throat pain   NECK: No pain or stiffness  RESPIRATORY: No cough, wheezing, hemoptysis; No shortness of breath  CARDIOVASCULAR: No chest pain or palpitations  GASTROINTESTINAL: No abdominal pain. No nausea, vomiting, diarrhea, or constipation. No melena. Mild suprapubic tenderness to palpation.  GENITOURINARY: No dysuria, frequency or hematuria  NEUROLOGICAL: No numbness or weakness. 5/5 strength in b/l UE and LE  SKIN: No itching, burning, rashes, or lesions   HEME: No bruising or bleeding  ENDO: No hypo-/hyper- thermia  MSK: +back pain with radiation down L groin and lateral thigh. Pain worsens with straight leg lift, pain does not extend past knee.  All other review of systems as per HPI. #Discharge: do not delete    Nataliia RICHTER is a 74 y/o f with PMHx of HTN, sciatica, breast CA (remission) presents c/o left buttock and L groin pain and muscle spasms radiating to left leg today, admitted for further mgmt and PT eval.    Problem List/Main Diagnoses:     # Lumbar radiculopathy  *P/w  left buttock pain and muscle spasms radiating to left leg. She has had similar episodes in the past.   *CT L-spine shows:  No evidence of acute osseous fracture or jacy dislocation. Multilevel degenerative change of lumbar spine, most significantly at the L2-L3 level where there is moderate to severe narrowing of the spinal canal. In the setting of neuropathy, recommend MRI of the lumbar spine  for further evaluation. Likely ddx spinal stenosis, piriformis syndrome given gluteal location and muscle spasms, disc herniation. No s/s of saddle anesthesia, bowel/bladder incontinence. Walks independently.  *S/p tramadol, morphine 2mg x1, methocarbamol 750mg x1, ketorolac 15mg x1, and valium 5mg x1.  *Neuro exam: MSK 5/5, sensation intact, +straight leg raise test  Plan:  - PT eval, discharge to [facility]  - Pain control; Tylenol for mild, lidocaine patch to affected area  - MR L-spine, to be done outpatient    # Hypertension  Plan:  - C/w home amlodipine 10mg qd with holding parameters.    # History of breast cancer  *History of L breast cancer (2005) in remission  *F/w Dr. Donaldson (sp?). Had a recent follow up with oncologist in October.  Plan:  - Ctm.    #Need for prophylactic measure  F: NI  E: Replete as needed  N: Regular diet  Dvt ppx: Lovenox  GI ppx: NI  Code status: FULL code    New medications/therapies:   New lines/hardware:  Labs to be followed outpatient:   Exam to be followed outpatient:     Discharge plan: discharge to [facility]    Physical Exam Upon Discharge:  CONSTITUTIONAL: No fevers, chills, sweats, weakness, fatigue, or weight changes  HEENT: No visual or hearing changes;  No vertigo or throat pain   NECK: No pain or stiffness  RESPIRATORY: No cough, wheezing, hemoptysis; No shortness of breath  CARDIOVASCULAR: No chest pain or palpitations  GASTROINTESTINAL: No abdominal pain. No nausea, vomiting, diarrhea, or constipation. No melena. Mild suprapubic tenderness to palpation.  GENITOURINARY: No dysuria, frequency or hematuria  NEUROLOGICAL: No numbness or weakness. 5/5 strength in b/l UE and LE  SKIN: No itching, burning, rashes, or lesions   HEME: No bruising or bleeding  ENDO: No hypo-/hyper- thermia  MSK: +back pain with radiation down L groin and lateral thigh. Pain worsens with straight leg lift, pain does not extend past knee.  All other review of systems as per HPI. #Discharge: do not delete    Nataliia RICHTER is a 74 y/o f with PMHx of HTN, sciatica, breast CA (remission) presents c/o left buttock and L groin pain and muscle spasms radiating to left leg today, admitted for further mgmt and PT eval.    Problem List/Main Diagnoses:     # Lumbar radiculopathy  *P/w  left buttock pain and muscle spasms radiating to left leg. She has had similar episodes in the past.   *CT L-spine shows:  No evidence of acute osseous fracture or jacy dislocation. Multilevel degenerative change of lumbar spine, most significantly at the L2-L3 level where there is moderate to severe narrowing of the spinal canal. In the setting of neuropathy, recommend MRI of the lumbar spine  for further evaluation. Likely ddx spinal stenosis, piriformis syndrome given gluteal location and muscle spasms, disc herniation. No s/s of saddle anesthesia, bowel/bladder incontinence. Walks independently.  *S/p tramadol, morphine 2mg x1, methocarbamol 750mg x1, ketorolac 15mg x1, and valium 5mg x1.  *Neuro exam: MSK 5/5, sensation intact, +straight leg raise test  Plan:  - PT eval, discharge to [facility]  - Pain control; Tylenol for mild, lidocaine patch to affected area  - MR L-spine, to be done outpatient    # Hypertension  Plan:  - C/w home amlodipine 10mg qd with holding parameters.    # History of breast cancer  *History of L breast cancer (2005) in remission  *F/w Dr. Donaldson (sp?). Had a recent follow up with oncologist in October.  Plan:  - Ctm.    #Need for prophylactic measure  F: NI  E: Replete as needed  N: Regular diet  Dvt ppx: Lovenox  GI ppx: NI  Code status: FULL code    New medications/therapies:   New lines/hardware:  Labs to be followed outpatient:   Exam to be followed outpatient:     Discharge plan: discharge to [facility]    Physical Exam Upon Discharge:  CONSTITUTIONAL: No fevers, chills, sweats, weakness, fatigue, or weight changes  HEENT: No visual or hearing changes;  No vertigo or throat pain   NECK: No pain or stiffness  RESPIRATORY: No cough, wheezing, hemoptysis; No shortness of breath  CARDIOVASCULAR: No chest pain or palpitations  GASTROINTESTINAL: No abdominal pain. No nausea, vomiting, diarrhea, or constipation. No melena. Mild suprapubic tenderness to palpation.  GENITOURINARY: No dysuria, frequency or hematuria  NEUROLOGICAL: No numbness or weakness. 5/5 strength in b/l UE and LE  SKIN: No itching, burning, rashes, or lesions   HEME: No bruising or bleeding  ENDO: No hypo-/hyper- thermia  MSK: +back pain with radiation down L groin and lateral thigh. Pain worsens with straight leg lift, pain does not extend past knee.  All other review of systems as per HPI. Nataliia RICHTER is a 72 y/o f with PMHx of HTN, sciatica, breast CA (remission) presents c/o left buttock and L groin pain and muscle spasms radiating to left leg today, admitted for further mgmt and PT eval.    Problem List/Main Diagnoses:     # Lumbar radiculopathy  *P/w  left buttock pain and muscle spasms radiating to left leg. She has had similar episodes in the past.   *CT L-spine shows:  No evidence of acute osseous fracture or jacy dislocation. Multilevel degenerative change of lumbar spine, most significantly at the L2-L3 level where there is moderate to severe narrowing of the spinal canal. In the setting of neuropathy, recommend MRI of the lumbar spine  for further evaluation. Likely ddx spinal stenosis, piriformis syndrome given gluteal location and muscle spasms, disc herniation. No s/s of saddle anesthesia, bowel/bladder incontinence. Walks independently.  *MR L-spine (1/8): L2-L3 moderate to severe spinal canal stenosis and left lateral recess narrowing with mass effect on the left greater than the right descending nerve roots. L4-L5 moderate to severe left and moderate right neural foraminal narrowing with mild to moderate spinal canal stenosis.  *S/p tramadol, morphine 2mg x1, methocarbamol 750mg x1, ketorolac 15mg x1, and valium 5mg x1.  *Neuro exam: MSK 5/5, sensation intact, +straight leg raise test  Plan:  - PT eval, discharge to [facility]  - Pain control; Tylenol for mild, lidocaine patch to affected area  - F/u outpatient with Dr. Jiang, orthopedics    # Hypertension  Plan:  - C/w home amlodipine 10mg qd with holding parameters.    # History of breast cancer  *History of L breast cancer (2005) in remission  *F/w Dr. Donaldson. Had a recent follow up with oncologist in October.  Plan:  - Ctm.    #Need for prophylactic measure  F: NI  E: Replete as needed  N: Regular diet  Dvt ppx: Lovenox  GI ppx: NI  Code status: FULL code    New medications/therapies:   New lines/hardware:  Labs to be followed outpatient:   Exam to be followed outpatient:     Discharge plan: discharge to [facility]    Physical Exam Upon Discharge:  CONSTITUTIONAL: No fevers, chills, sweats, weakness, fatigue, or weight changes  HEENT: No visual or hearing changes;  No vertigo or throat pain   NECK: No pain or stiffness  RESPIRATORY: No cough, wheezing, hemoptysis; No shortness of breath  CARDIOVASCULAR: No chest pain or palpitations  GASTROINTESTINAL: No abdominal pain. No nausea, vomiting, diarrhea, or constipation. No melena. Mild suprapubic tenderness to palpation.  GENITOURINARY: No dysuria, frequency or hematuria  NEUROLOGICAL: No numbness or weakness. 5/5 strength in b/l UE and LE  SKIN: No itching, burning, rashes, or lesions   HEME: No bruising or bleeding  ENDO: No hypo-/hyper- thermia  MSK: +back pain with radiation down L groin and lateral thigh. Pain worsens with straight leg lift, pain does not extend past knee.  All other review of systems as per HPI. Nataliia RICHTER is a 72 y/o f with PMHx of HTN, sciatica, breast CA (remission) presents c/o left buttock and L groin pain and muscle spasms radiating to left leg today, admitted for further mgmt and PT eval.    Problem List/Main Diagnoses:     # Lumbar radiculopathy  *P/w  left buttock pain and muscle spasms radiating to left leg. She has had similar episodes in the past.   *CT L-spine shows:  No evidence of acute osseous fracture or jacy dislocation. Multilevel degenerative change of lumbar spine, most significantly at the L2-L3 level where there is moderate to severe narrowing of the spinal canal. In the setting of neuropathy, recommend MRI of the lumbar spine  for further evaluation. Likely ddx spinal stenosis, piriformis syndrome given gluteal location and muscle spasms, disc herniation. No s/s of saddle anesthesia, bowel/bladder incontinence. Walks independently.  *MR L-spine (1/8): L2-L3 moderate to severe spinal canal stenosis and left lateral recess narrowing with mass effect on the left greater than the right descending nerve roots. L4-L5 moderate to severe left and moderate right neural foraminal narrowing with mild to moderate spinal canal stenosis.  *S/p tramadol, morphine 2mg x1, methocarbamol 750mg x1, ketorolac 15mg x1, and valium 5mg x1.  *Neuro exam: MSK 5/5, sensation intact, +straight leg raise test  Plan:  - PT eval, discharge to [facility]  - Pain control; Tylenol for mild, lidocaine patch to affected area  - F/u outpatient with Dr. Jiang, orthopedics    # Hypertension  Plan:  - C/w home amlodipine 10mg qd with holding parameters.    # History of breast cancer  *History of L breast cancer (2005) in remission  *F/w Dr. Donaldson. Had a recent follow up with oncologist in October.  Plan:  - Ctm.    #Need for prophylactic measure  F: NI  E: Replete as needed  N: Regular diet  Dvt ppx: Lovenox  GI ppx: NI  Code status: FULL code    New medications/therapies:   New lines/hardware:  Labs to be followed outpatient:   Exam to be followed outpatient:     [facility] [facility]    Physical Exam Upon Discharge:  CONSTITUTIONAL: No fevers, chills, sweats, weakness, fatigue, or weight changes  HEENT: No visual or hearing changes;  No vertigo or throat pain   NECK: No pain or stiffness  RESPIRATORY: No cough, wheezing, hemoptysis; No shortness of breath  CARDIOVASCULAR: No chest pain or palpitations  GASTROINTESTINAL: No abdominal pain. No nausea, vomiting, diarrhea, or constipation. No melena. Mild suprapubic tenderness to palpation.  GENITOURINARY: No dysuria, frequency or hematuria  NEUROLOGICAL: No numbness or weakness. 5/5 strength in b/l UE and LE  SKIN: No itching, burning, rashes, or lesions   HEME: No bruising or bleeding  ENDO: No hypo-/hyper- thermia  MSK: +back pain with radiation down L groin and lateral thigh. Pain worsens with straight leg lift, pain does not extend past knee.  All other review of systems as per HPI. Nataliia RICHTER is a 74 y/o f with PMHx of HTN, sciatica, breast CA (remission) presents c/o left buttock and L groin pain and muscle spasms radiating to left leg today, admitted for further mgmt and PT eval.    Problem List/Main Diagnoses:     # Lumbar radiculopathy  *P/w  left buttock pain and muscle spasms radiating to left leg. She has had similar episodes in the past.   *CT L-spine shows:  No evidence of acute osseous fracture or jacy dislocation. Multilevel degenerative change of lumbar spine, most significantly at the L2-L3 level where there is moderate to severe narrowing of the spinal canal. In the setting of neuropathy, recommend MRI of the lumbar spine  for further evaluation. Likely ddx spinal stenosis, piriformis syndrome given gluteal location and muscle spasms, disc herniation. No s/s of saddle anesthesia, bowel/bladder incontinence. Walks independently.  *MR L-spine (1/8): L2-L3 moderate to severe spinal canal stenosis and left lateral recess narrowing with mass effect on the left greater than the right descending nerve roots. L4-L5 moderate to severe left and moderate right neural foraminal narrowing with mild to moderate spinal canal stenosis.  *S/p tramadol, morphine 2mg x1, methocarbamol 750mg x1, ketorolac 15mg x1, and valium 5mg x1.  *Neuro exam: MSK 5/5, sensation intact, +straight leg raise test  Plan:  - PT eval, recommends discharge to [facility]  - Pain control; Tylenol for mild, lidocaine patch to affected area  - F/u outpatient with Dr. Jiang, orthopedics    # Hypertension  Plan:  - C/w home amlodipine 10mg qd with holding parameters.    # History of breast cancer  *History of L breast cancer (2005) in remission  *F/w Dr. Donaldson. Had a recent follow up with oncologist in October.  Plan:  - Ctm.    #Need for prophylactic measure  F: NI  E: Replete as needed  N: Regular diet  Dvt ppx: Lovenox  GI ppx: NI  Code status: FULL code    New medications/therapies: Cyclobenzaprine 5mg TID PRN back pain  Exam to be followed outpatient: MR findings to be discussed with outpatient orthopedics    Physical Exam Upon Discharge:  CONSTITUTIONAL: No fevers, chills, sweats, weakness, fatigue, or weight changes  HEENT: No visual or hearing changes;  No vertigo or throat pain   NECK: No pain or stiffness  RESPIRATORY: No cough, wheezing, hemoptysis; No shortness of breath  CARDIOVASCULAR: No chest pain or palpitations  GASTROINTESTINAL: No abdominal pain. No nausea, vomiting, diarrhea, or constipation. No melena. Mild suprapubic tenderness to palpation.  GENITOURINARY: No dysuria, frequency or hematuria  NEUROLOGICAL: No numbness or weakness. 5/5 strength in b/l UE and LE  SKIN: No itching, burning, rashes, or lesions   HEME: No bruising or bleeding  ENDO: No hypo-/hyper- thermia  MSK: +back pain with radiation down L groin and lateral thigh. Pain worsens with straight leg lift, pain does not extend past knee.  All other review of systems as per HPI. Nataliia RICHTER is a 74 y/o f with PMHx of HTN, sciatica, breast CA (remission) presents c/o left buttock and L groin pain and muscle spasms radiating to left leg today, admitted for further mgmt and PT eval.    Problem List/Main Diagnoses:     # Lumbar radiculopathy  *P/w  left buttock pain and muscle spasms radiating to left leg. She has had similar episodes in the past.   *CT L-spine shows:  No evidence of acute osseous fracture or jacy dislocation. Multilevel degenerative change of lumbar spine, most significantly at the L2-L3 level where there is moderate to severe narrowing of the spinal canal. In the setting of neuropathy, recommend MRI of the lumbar spine  for further evaluation. Likely ddx spinal stenosis, piriformis syndrome given gluteal location and muscle spasms, disc herniation. No s/s of saddle anesthesia, bowel/bladder incontinence. Walks independently.  *MR L-spine (1/8): L2-L3 moderate to severe spinal canal stenosis and left lateral recess narrowing with mass effect on the left greater than the right descending nerve roots. L4-L5 moderate to severe left and moderate right neural foraminal narrowing with mild to moderate spinal canal stenosis.  *S/p tramadol, morphine 2mg x1, methocarbamol 750mg x1, ketorolac 15mg x1, and valium 5mg x1.  *Neuro exam: MSK 5/5, sensation intact, +straight leg raise test  Plan:  - PT eval, recommends discharge to [facility]  - Pain control; Tylenol for mild, lidocaine patch to affected area  - F/u outpatient with Dr. Jiang, orthopedics    # Hypertension  Plan:  - C/w home amlodipine 10mg qd with holding parameters.    # History of breast cancer  *History of L breast cancer (2005) in remission  *F/w Dr. Donaldson. Had a recent follow up with oncologist in October.  Plan:  - Ctm.    #Need for prophylactic measure  F: NI  E: Replete as needed  N: Regular diet  Dvt ppx: Lovenox  GI ppx: NI  Code status: FULL code    New medications/therapies: Cyclobenzaprine 5mg TID PRN back pain  Exam to be followed outpatient: MR findings to be discussed with outpatient orthopedics    Physical Exam Upon Discharge:  CONSTITUTIONAL: No fevers, chills, sweats, weakness, fatigue, or weight changes  HEENT: No visual or hearing changes;  No vertigo or throat pain   NECK: No pain or stiffness  RESPIRATORY: No cough, wheezing, hemoptysis; No shortness of breath  CARDIOVASCULAR: No chest pain or palpitations  GASTROINTESTINAL: No abdominal pain. No nausea, vomiting, diarrhea, or constipation. No melena. Mild suprapubic tenderness to palpation.  GENITOURINARY: No dysuria, frequency or hematuria  NEUROLOGICAL: No numbness or weakness. 5/5 strength in b/l UE and LE  SKIN: No itching, burning, rashes, or lesions   HEME: No bruising or bleeding  ENDO: No hypo-/hyper- thermia  MSK: +back pain with radiation down L groin and lateral thigh. Pain worsens with straight leg lift, pain does not extend past knee.  All other review of systems as per HPI.

## 2024-01-08 NOTE — DISCHARGE NOTE PROVIDER - PROVIDER TOKENS
PROVIDER:[TOKEN:[[id number]],FOLLOWUP:[2 weeks],ESTABLISHEDPATIENT:[T]] PROVIDER:[TOKEN:[[id number]:[id number]],FOLLOWUP:[2 weeks],ESTABLISHEDPATIENT:[T]] PROVIDER:[TOKEN:[[id number]:[id number]],SCHEDULEDAPPT:[01/22/2024],SCHEDULEDAPPTTIME:[04:15 PM],ESTABLISHEDPATIENT:[T]] PROVIDER:[TOKEN:[[id number]:[id number]],SCHEDULEDAPPT:[01/22/2024],SCHEDULEDAPPTTIME:[04:15 PM],ESTABLISHEDPATIENT:[T]] PROVIDER:[TOKEN:[[id number]],SCHEDULEDAPPT:[01/22/2024],SCHEDULEDAPPTTIME:[04:15 PM],ESTABLISHEDPATIENT:[T]],PROVIDER:[TOKEN:[[id number]],FOLLOWUP:[2 weeks]] PROVIDER:[TOKEN:[[id number]],SCHEDULEDAPPT:[01/22/2024],SCHEDULEDAPPTTIME:[04:15 PM],ESTABLISHEDPATIENT:[T]],PROVIDER:[TOKEN:[[id number]],FOLLOWUP:[2 weeks]] PROVIDER:[TOKEN:[[id number]],SCHEDULEDAPPT:[01/22/2024],SCHEDULEDAPPTTIME:[04:15 PM],ESTABLISHEDPATIENT:[T]],PROVIDER:[TOKEN:[[id number]],SCHEDULEDAPPT:[01/17/2024],SCHEDULEDAPPTTIME:[10:45 AM]] PROVIDER:[TOKEN:[[id number]],SCHEDULEDAPPT:[01/22/2024],SCHEDULEDAPPTTIME:[04:15 PM],ESTABLISHEDPATIENT:[T]],PROVIDER:[TOKEN:[[id number]],SCHEDULEDAPPT:[01/17/2024],SCHEDULEDAPPTTIME:[10:45 AM]]

## 2024-01-08 NOTE — DISCHARGE NOTE PROVIDER - NSDCCPCAREPLAN_GEN_ALL_CORE_FT
PRINCIPAL DISCHARGE DIAGNOSIS  Diagnosis: Back pain  Assessment and Plan of Treatment: Acute back pain is sudden and usually short-lived.  Please use proper lifting techniques. When you bend and lift, use positions that put less stress on your back.  Please follow up with Dr. Jiang, the orthopedic doctor, who has seen you during this hospitalization, to discuss further treatment.     PRINCIPAL DISCHARGE DIAGNOSIS  Diagnosis: Back pain  Assessment and Plan of Treatment: Acute back pain is sudden and usually short-lived.  Please use proper lifting techniques. When you bend and lift, use positions that put less stress on your back.  Please follow up with Dr. Jiang, the orthopedic doctor, who has seen you during this hospitalization (on January 16th, 2:15pm at [address] [place] [address] [address], [place] [address]) to discuss further treatment.     PRINCIPAL DISCHARGE DIAGNOSIS  Diagnosis: Back pain  Assessment and Plan of Treatment: Acute back pain is sudden and usually short-lived.  Please use proper lifting techniques. When you bend and lift, use positions that put less stress on your back.  Please follow up with Dr. Jiang, the orthopedic doctor, who has seen you during this hospitalization (on January 16th, 2:15pm at [address] [place] [address] [address], [place] [address]) to discuss further treatment.

## 2024-01-08 NOTE — PROGRESS NOTE ADULT - ASSESSMENT
72 y/o F with history of HTN, sciatica, breast CA (remission) presents c/o left buttock and L groin pain and muscle spasms radiating to left leg today, admitted for further management and PT eval.

## 2024-01-09 PROBLEM — M54.30 SCIATICA, UNSPECIFIED SIDE: Chronic | Status: ACTIVE | Noted: 2024-01-07

## 2024-01-09 PROBLEM — C50.919 MALIGNANT NEOPLASM OF UNSPECIFIED SITE OF UNSPECIFIED FEMALE BREAST: Chronic | Status: ACTIVE | Noted: 2024-01-07

## 2024-01-09 LAB
ANION GAP SERPL CALC-SCNC: 10 MMOL/L — SIGNIFICANT CHANGE UP (ref 5–17)
ANION GAP SERPL CALC-SCNC: 10 MMOL/L — SIGNIFICANT CHANGE UP (ref 5–17)
BASOPHILS # BLD AUTO: 0.02 K/UL — SIGNIFICANT CHANGE UP (ref 0–0.2)
BASOPHILS # BLD AUTO: 0.02 K/UL — SIGNIFICANT CHANGE UP (ref 0–0.2)
BASOPHILS NFR BLD AUTO: 0.2 % — SIGNIFICANT CHANGE UP (ref 0–2)
BASOPHILS NFR BLD AUTO: 0.2 % — SIGNIFICANT CHANGE UP (ref 0–2)
BUN SERPL-MCNC: 11 MG/DL — SIGNIFICANT CHANGE UP (ref 7–23)
BUN SERPL-MCNC: 11 MG/DL — SIGNIFICANT CHANGE UP (ref 7–23)
CALCIUM SERPL-MCNC: 9.6 MG/DL — SIGNIFICANT CHANGE UP (ref 8.4–10.5)
CALCIUM SERPL-MCNC: 9.6 MG/DL — SIGNIFICANT CHANGE UP (ref 8.4–10.5)
CHLORIDE SERPL-SCNC: 100 MMOL/L — SIGNIFICANT CHANGE UP (ref 96–108)
CHLORIDE SERPL-SCNC: 100 MMOL/L — SIGNIFICANT CHANGE UP (ref 96–108)
CO2 SERPL-SCNC: 25 MMOL/L — SIGNIFICANT CHANGE UP (ref 22–31)
CO2 SERPL-SCNC: 25 MMOL/L — SIGNIFICANT CHANGE UP (ref 22–31)
CREAT SERPL-MCNC: 0.74 MG/DL — SIGNIFICANT CHANGE UP (ref 0.5–1.3)
CREAT SERPL-MCNC: 0.74 MG/DL — SIGNIFICANT CHANGE UP (ref 0.5–1.3)
EGFR: 86 ML/MIN/1.73M2 — SIGNIFICANT CHANGE UP
EGFR: 86 ML/MIN/1.73M2 — SIGNIFICANT CHANGE UP
EOSINOPHIL # BLD AUTO: 0.14 K/UL — SIGNIFICANT CHANGE UP (ref 0–0.5)
EOSINOPHIL # BLD AUTO: 0.14 K/UL — SIGNIFICANT CHANGE UP (ref 0–0.5)
EOSINOPHIL NFR BLD AUTO: 1.2 % — SIGNIFICANT CHANGE UP (ref 0–6)
EOSINOPHIL NFR BLD AUTO: 1.2 % — SIGNIFICANT CHANGE UP (ref 0–6)
GLUCOSE SERPL-MCNC: 105 MG/DL — HIGH (ref 70–99)
GLUCOSE SERPL-MCNC: 105 MG/DL — HIGH (ref 70–99)
HCT VFR BLD CALC: 40.4 % — SIGNIFICANT CHANGE UP (ref 34.5–45)
HCT VFR BLD CALC: 40.4 % — SIGNIFICANT CHANGE UP (ref 34.5–45)
HGB BLD-MCNC: 13.4 G/DL — SIGNIFICANT CHANGE UP (ref 11.5–15.5)
HGB BLD-MCNC: 13.4 G/DL — SIGNIFICANT CHANGE UP (ref 11.5–15.5)
IMM GRANULOCYTES NFR BLD AUTO: 0.2 % — SIGNIFICANT CHANGE UP (ref 0–0.9)
IMM GRANULOCYTES NFR BLD AUTO: 0.2 % — SIGNIFICANT CHANGE UP (ref 0–0.9)
LYMPHOCYTES # BLD AUTO: 16.6 % — SIGNIFICANT CHANGE UP (ref 13–44)
LYMPHOCYTES # BLD AUTO: 16.6 % — SIGNIFICANT CHANGE UP (ref 13–44)
LYMPHOCYTES # BLD AUTO: 2 K/UL — SIGNIFICANT CHANGE UP (ref 1–3.3)
LYMPHOCYTES # BLD AUTO: 2 K/UL — SIGNIFICANT CHANGE UP (ref 1–3.3)
MAGNESIUM SERPL-MCNC: 2.2 MG/DL — SIGNIFICANT CHANGE UP (ref 1.6–2.6)
MAGNESIUM SERPL-MCNC: 2.2 MG/DL — SIGNIFICANT CHANGE UP (ref 1.6–2.6)
MCHC RBC-ENTMCNC: 27.5 PG — SIGNIFICANT CHANGE UP (ref 27–34)
MCHC RBC-ENTMCNC: 27.5 PG — SIGNIFICANT CHANGE UP (ref 27–34)
MCHC RBC-ENTMCNC: 33.2 GM/DL — SIGNIFICANT CHANGE UP (ref 32–36)
MCHC RBC-ENTMCNC: 33.2 GM/DL — SIGNIFICANT CHANGE UP (ref 32–36)
MCV RBC AUTO: 83 FL — SIGNIFICANT CHANGE UP (ref 80–100)
MCV RBC AUTO: 83 FL — SIGNIFICANT CHANGE UP (ref 80–100)
MONOCYTES # BLD AUTO: 0.88 K/UL — SIGNIFICANT CHANGE UP (ref 0–0.9)
MONOCYTES # BLD AUTO: 0.88 K/UL — SIGNIFICANT CHANGE UP (ref 0–0.9)
MONOCYTES NFR BLD AUTO: 7.3 % — SIGNIFICANT CHANGE UP (ref 2–14)
MONOCYTES NFR BLD AUTO: 7.3 % — SIGNIFICANT CHANGE UP (ref 2–14)
NEUTROPHILS # BLD AUTO: 8.98 K/UL — HIGH (ref 1.8–7.4)
NEUTROPHILS # BLD AUTO: 8.98 K/UL — HIGH (ref 1.8–7.4)
NEUTROPHILS NFR BLD AUTO: 74.5 % — SIGNIFICANT CHANGE UP (ref 43–77)
NEUTROPHILS NFR BLD AUTO: 74.5 % — SIGNIFICANT CHANGE UP (ref 43–77)
NRBC # BLD: 0 /100 WBCS — SIGNIFICANT CHANGE UP (ref 0–0)
NRBC # BLD: 0 /100 WBCS — SIGNIFICANT CHANGE UP (ref 0–0)
PHOSPHATE SERPL-MCNC: 3.5 MG/DL — SIGNIFICANT CHANGE UP (ref 2.5–4.5)
PHOSPHATE SERPL-MCNC: 3.5 MG/DL — SIGNIFICANT CHANGE UP (ref 2.5–4.5)
PLATELET # BLD AUTO: 227 K/UL — SIGNIFICANT CHANGE UP (ref 150–400)
PLATELET # BLD AUTO: 227 K/UL — SIGNIFICANT CHANGE UP (ref 150–400)
POTASSIUM SERPL-MCNC: 4.2 MMOL/L — SIGNIFICANT CHANGE UP (ref 3.5–5.3)
POTASSIUM SERPL-MCNC: 4.2 MMOL/L — SIGNIFICANT CHANGE UP (ref 3.5–5.3)
POTASSIUM SERPL-SCNC: 4.2 MMOL/L — SIGNIFICANT CHANGE UP (ref 3.5–5.3)
POTASSIUM SERPL-SCNC: 4.2 MMOL/L — SIGNIFICANT CHANGE UP (ref 3.5–5.3)
RBC # BLD: 4.87 M/UL — SIGNIFICANT CHANGE UP (ref 3.8–5.2)
RBC # BLD: 4.87 M/UL — SIGNIFICANT CHANGE UP (ref 3.8–5.2)
RBC # FLD: 14.8 % — HIGH (ref 10.3–14.5)
RBC # FLD: 14.8 % — HIGH (ref 10.3–14.5)
SODIUM SERPL-SCNC: 135 MMOL/L — SIGNIFICANT CHANGE UP (ref 135–145)
SODIUM SERPL-SCNC: 135 MMOL/L — SIGNIFICANT CHANGE UP (ref 135–145)
WBC # BLD: 12.05 K/UL — HIGH (ref 3.8–10.5)
WBC # BLD: 12.05 K/UL — HIGH (ref 3.8–10.5)
WBC # FLD AUTO: 12.05 K/UL — HIGH (ref 3.8–10.5)
WBC # FLD AUTO: 12.05 K/UL — HIGH (ref 3.8–10.5)

## 2024-01-09 PROCEDURE — 99221 1ST HOSP IP/OBS SF/LOW 40: CPT

## 2024-01-09 PROCEDURE — 99233 SBSQ HOSP IP/OBS HIGH 50: CPT

## 2024-01-09 RX ORDER — KETOROLAC TROMETHAMINE 30 MG/ML
15 SYRINGE (ML) INJECTION ONCE
Refills: 0 | Status: DISCONTINUED | OUTPATIENT
Start: 2024-01-09 | End: 2024-01-09

## 2024-01-09 RX ADMIN — GABAPENTIN 100 MILLIGRAM(S): 400 CAPSULE ORAL at 07:52

## 2024-01-09 RX ADMIN — Medication 15 MILLIGRAM(S): at 06:03

## 2024-01-09 RX ADMIN — Medication 15 MILLIGRAM(S): at 07:00

## 2024-01-09 RX ADMIN — ENOXAPARIN SODIUM 40 MILLIGRAM(S): 100 INJECTION SUBCUTANEOUS at 05:55

## 2024-01-09 RX ADMIN — PANTOPRAZOLE SODIUM 40 MILLIGRAM(S): 20 TABLET, DELAYED RELEASE ORAL at 06:52

## 2024-01-09 RX ADMIN — AMLODIPINE BESYLATE 10 MILLIGRAM(S): 2.5 TABLET ORAL at 05:55

## 2024-01-09 RX ADMIN — GABAPENTIN 100 MILLIGRAM(S): 400 CAPSULE ORAL at 19:15

## 2024-01-09 NOTE — CONSULT NOTE ADULT - SUBJECTIVE AND OBJECTIVE BOX
Orthopaedic Surgery Consult Note    CC: Patient is a 73y old  Female who presents with a chief complaint of Further mgmt of back pain and PT eval (08 Jan 2024 07:27)      HPI: 73yoF with pmhx chronic back pain after work injury in 1986 presented to ED after gradual worsening of LLE pain over the weekend. Pt denies trauma or injury. Reports pain started Friday night, worsened until Sunday morning, she was unable to ambulate upon waking up and called EMS. Pt describes LLE pain as a pulling sensation of her left anterior thigh radiating into her groin and the lateral aspect of her anterior thigh as well. Pt states she has been unable to walk since Sunday. Upon chart review, documented 30ft ambulating with RW with PT. Pt states since admissions, pain has remained the same, although IV medication helped this morning. Pt has   HPI:  PCP: Dr Bill  - - - - -    HPI: This is a 74 y/o f hx HTN, sciatica, breast CA (remission) presents c/o left buttock pain radiating to left leg today.  Pt stating she feels "pulling" in the area which feels similar to muscle spasms she has had in the past.  Pt stating her symptoms started gradually 2 days ago and has worsened since then.  Pt took gabapentin without improvement.  Pt stating today she hasn't been able to walk, tried getting out of bed but wasn't able to take a step due to pain so called EMS.  Pt denies numbness/tingling to ext, weakness, saddle anesthesia, bowel/bladder incontinence, trauma, fall, all other ROS negative.    In the ED:  Initial vital signs: T: 98F, HR: 88, BP: 151/84 R: 16, SpO2: 98% on RA  ED course:   Labs: significant for WBC 12.35, CMP WNL, UA negative  Imaging: CT L-spine shows:  No evidence of acute osseous fracture or jacy dislocation. Multilevel degenerative change of lumbar spine, most significantly at the L2-L3 level where there is moderate to severe narrowing of the spinal canal. In the setting of neuropathy, recommend MRI of the lumbar spine  for further evaluation.   CXR: NI  EKG: Pending  Medications: s/p tramadol, morphine 2mg IVP x1, robaxin 750mg x1  Consults: PT   (07 Jan 2024 14:52)        ROS: Positive for  Denies fevers, chills, headache, dizziness, chest pain, shortness of breath, nausea, vomiting.   All other systems negative, except for above.     Allergies    No Known Allergies    Intolerances      PAST MEDICAL & SURGICAL HISTORY:  Breast cancer      Sciatica        Social History:  Former smoker  Lives in public housing  With [facility] 3d/week  [employer]lev[employer] building (07 Jan 2024 14:52)    FAMILY HISTORY:    Medications:     Vital Signs Last 24 Hrs  T(C): 37.2 (09 Jan 2024 08:37), Max: 37.2 (09 Jan 2024 08:37)  T(F): 99 (09 Jan 2024 08:37), Max: 99 (09 Jan 2024 08:37)  HR: 81 (09 Jan 2024 08:37) (81 - 90)  BP: 137/78 (09 Jan 2024 08:37) (123/66 - 159/74)  BP(mean): --  RR: 18 (09 Jan 2024 08:37) (16 - 18)  SpO2: 97% (09 Jan 2024 08:37) (95% - 98%)    Parameters below as of 09 Jan 2024 08:37  Patient On (Oxygen Delivery Method): room air        PE:  General: Well developed, well nourished, in no acute distress, comfortable  Neuro: Alert, oriented x 3  Psych: Normal mood & affect   Skin: Warm, dry, extremities well perfused, no obvious rash  MSK:    -Inspection/palpation:    -Sensation:    -Motor:     -ROM:    -Pulses:                             13.4   12.05 )-----------( 227      ( 09 Jan 2024 05:30 )             40.4     01-09    135  |  100  |  11  ----------------------------<  105<H>  4.2   |  25  |  0.74    Ca    9.6      09 Jan 2024 05:30  Phos  3.5     01-09  Mg     2.2     01-09          Imaging:     A/P: 73yFemale      Risks and benefits were discussed for   Weight bearing status  Conservative treatment rest, ice, elevation, NSAIDs  Patient history, exam, imaging and plan discussed with   who is in agreement    Ortho Pager [phone]    ___ minutes spent on total encounter, over 50% of the visit was spent counseling and/or coordinating care.    Orthopaedic Surgery Consult Note    CC: Patient is a 73y old  Female who presents with a chief complaint of Further mgmt of back pain and PT eval (08 Jan 2024 07:27)      HPI: 73yoF with pmhx chronic back pain after work injury in 1986 presented to ED after gradual worsening of LLE pain over the weekend. Pt denies trauma or injury. Reports pain started Friday night, worsened until Sunday morning, she was unable to ambulate upon waking up and called EMS. Pt describes LLE pain as a pulling sensation of her left anterior thigh radiating into her groin and the lateral aspect of her anterior thigh as well. Pt states she has been unable to walk since Sunday. Upon chart review, documented 30ft ambulating with RW with PT. Pt states since admissions, pain has remained the same, although IV medication helped this morning. Pt has   HPI:  PCP: Dr Bill  - - - - -    HPI: This is a 72 y/o f hx HTN, sciatica, breast CA (remission) presents c/o left buttock pain radiating to left leg today.  Pt stating she feels "pulling" in the area which feels similar to muscle spasms she has had in the past.  Pt stating her symptoms started gradually 2 days ago and has worsened since then.  Pt took gabapentin without improvement.  Pt stating today she hasn't been able to walk, tried getting out of bed but wasn't able to take a step due to pain so called EMS.  Pt denies numbness/tingling to ext, weakness, saddle anesthesia, bowel/bladder incontinence, trauma, fall, all other ROS negative.    In the ED:  Initial vital signs: T: 98F, HR: 88, BP: 151/84 R: 16, SpO2: 98% on RA  ED course:   Labs: significant for WBC 12.35, CMP WNL, UA negative  Imaging: CT L-spine shows:  No evidence of acute osseous fracture or jacy dislocation. Multilevel degenerative change of lumbar spine, most significantly at the L2-L3 level where there is moderate to severe narrowing of the spinal canal. In the setting of neuropathy, recommend MRI of the lumbar spine  for further evaluation.   CXR: NI  EKG: Pending  Medications: s/p tramadol, morphine 2mg IVP x1, robaxin 750mg x1  Consults: PT   (07 Jan 2024 14:52)        ROS: Positive for  Denies fevers, chills, headache, dizziness, chest pain, shortness of breath, nausea, vomiting.   All other systems negative, except for above.     Allergies    No Known Allergies    Intolerances      PAST MEDICAL & SURGICAL HISTORY:  Breast cancer      Sciatica        Social History:  Former smoker  Lives in public housing  With [facility] 3d/week  [employer]lev[employer] building (07 Jan 2024 14:52)    FAMILY HISTORY:    Medications:     Vital Signs Last 24 Hrs  T(C): 37.2 (09 Jan 2024 08:37), Max: 37.2 (09 Jan 2024 08:37)  T(F): 99 (09 Jan 2024 08:37), Max: 99 (09 Jan 2024 08:37)  HR: 81 (09 Jan 2024 08:37) (81 - 90)  BP: 137/78 (09 Jan 2024 08:37) (123/66 - 159/74)  BP(mean): --  RR: 18 (09 Jan 2024 08:37) (16 - 18)  SpO2: 97% (09 Jan 2024 08:37) (95% - 98%)    Parameters below as of 09 Jan 2024 08:37  Patient On (Oxygen Delivery Method): room air        PE:  General: Well developed, well nourished, in no acute distress, comfortable  Neuro: Alert, oriented x 3  Psych: Normal mood & affect   Skin: Warm, dry, extremities well perfused, no obvious rash  MSK:    -Inspection/palpation:    -Sensation:    -Motor:     -ROM:    -Pulses:                             13.4   12.05 )-----------( 227      ( 09 Jan 2024 05:30 )             40.4     01-09    135  |  100  |  11  ----------------------------<  105<H>  4.2   |  25  |  0.74    Ca    9.6      09 Jan 2024 05:30  Phos  3.5     01-09  Mg     2.2     01-09          Imaging:     A/P: 73yFemale      Risks and benefits were discussed for   Weight bearing status  Conservative treatment rest, ice, elevation, NSAIDs  Patient history, exam, imaging and plan discussed with   who is in agreement    Ortho Pager [phone]    ___ minutes spent on total encounter, over 50% of the visit was spent counseling and/or coordinating care.    Orthopaedic Surgery Consult Note    CC: Patient is a 73y old  Female who presents with a chief complaint of Further mgmt of back pain and PT boubacar (08 Jan 2024 07:27)      HPI: 73yoF with pmhx chronic back pain after work injury in 1986 presented to ED after gradual worsening of LLE pain over the weekend. Pt denies trauma or injury or falls. Reports pain started Friday night, and progressed until Sunday morning when she was unable to ambulate upon waking up and called EMS. Pt describes LLE pain as a pulling sensation of her left anterior thigh radiating into her groin and the lateral aspect of her anterior thigh as well worse with attempting to put weight on her LLE or lifting leg to get out of the bed. Denies numbness or tingling into bilateral lower extremities. Denies bladder/bowel incontinence or retention although states she has not been eating a lot here so has not had BM since admission. Denies saddle anesthesia. Pt states she has been unable to walk since Sunday. Upon chart review, documented 30ft ambulating with RW with PT. Pt states since admission, pain has remained the same, although IV medication helped this morning. Pt states back pain has been managed by her PCP. She takes baclofen po 100mg and meloxicam 7.5mg oral as needed for the pain. She states she is normally able to ambulate >20 blocks with her RW although admits to having a fall in November due to her RW brake malfunctioning. She has participated in 3 months of physical therapy for her back this past fall. She also has a lumbar brace she is unsure of name that she wears prn. Denies previous spine surgeries and states surgery was recommended at time of initial injury however pt deferred and states she "does not want surgery unless it is life threatening."     Denies fever, chills, CP.       PCP: Dr Bill  - - - - -    HPI: This is a 74 y/o f hx HTN, sciatica, breast CA (remission) presents c/o left buttock pain radiating to left leg today.  Pt stating she feels "pulling" in the area which feels similar to muscle spasms she has had in the past.  Pt stating her symptoms started gradually 2 days ago and has worsened since then.  Pt took gabapentin without improvement.  Pt stating today she hasn't been able to walk, tried getting out of bed but wasn't able to take a step due to pain so called EMS.  Pt denies numbness/tingling to ext, weakness, saddle anesthesia, bowel/bladder incontinence, trauma, fall, all other ROS negative    ROS: Positive for above  All other systems negative, except for above.     Allergies  No Known Allergies      PAST MEDICAL & SURGICAL HISTORY:  Breast cancer  Sciatica        Social History: has a home health aide that comes 9-1 3x a week  Former smoker  Lives in public housing  With [facility]HA 3d/week  [employer]lev[employer] building (07 Jan 2024 14:52)    FAMILY HISTORY: noncontributory    Medications: see med rec    Vital Signs Last 24 Hrs  T(C): 37.2 (09 Jan 2024 08:37), Max: 37.2 (09 Jan 2024 08:37)  T(F): 99 (09 Jan 2024 08:37), Max: 99 (09 Jan 2024 08:37)  HR: 81 (09 Jan 2024 08:37) (81 - 90)  BP: 137/78 (09 Jan 2024 08:37) (123/66 - 159/74)  BP(mean): --  RR: 18 (09 Jan 2024 08:37) (16 - 18)  SpO2: 97% (09 Jan 2024 08:37) (95% - 98%)    Parameters below as of 09 Jan 2024 08:37  Patient On (Oxygen Delivery Method): room air        PE:  VS: stable, reviewed per nursing documentation  General: No acute distress, resting comfortably  Neuro: Alert, oriented x 3  Psych: Normal mood & affect  HEENT: normocephalic, atraumatic   Neck: trachea midline  Respiratory: nonlabored on room air   CV: no cyanosis, no peripheral edema   Skin: Warm, dry, no rash, no lesions, no abrasions no ecchymosis   MSK: atraumatic with exception of below  Lumbar spine    -Inspection/palpation: no swelling, warmth, deformity of cervical, thoracic or lumbar spine appreciated, nontender to palpation over the cervical, thoracic, lumbar spine or paraspinal muscles    -Compartments: soft, nontender bilaterally     -Motor: Unable to SLR without assistance LLE, able to SLR on RLE, able to hold left knee flexed against resistance, TA/GS/EHL/FHL 5/5 bilateral lower extremities     -Sensation: intact to light touch bilateral lower extremities    -Pulses: 2+ DP pulses bilaterally, symmetric, extremities warm and well perfused, capillary refill brisk                            13.4   12.05 )-----------( 227      ( 09 Jan 2024 05:30 )             40.4     01-09    135  |  100  |  11  ----------------------------<  105<H>  4.2   |  25  |  0.74    Ca    9.6      09 Jan 2024 05:30  Phos  3.5     01-09  Mg     2.2     01-09          Imaging:   CT Lumbar spine without contrast 1/7/24  IMPRESSION:    1.  No evidence of acute osseous fracture or jacy dislocation.  2.  Multilevel degenerative change of lumbar spine, most significantly at   the L2-L3 level where there is moderate to severe narrowing of the spinal   canal. In the setting of neuropathy, recommend MRI of the lumbar spine   for further evaluation.  3.  Other abdominal findings as discussed above.    --- End of Report ---    MRI Lumbar spine without contrast 1/8/24  IMPRESSION:    L2-L3 moderate to severe spinal canal stenosis and left lateral recess   narrowing with mass effect on the left greater than the right descending   nerve roots.    L4-L5 moderate to severe left and moderate right neural foraminal   narrowing with mild to moderate spinal canal stenosis.    There is a change from the preliminary interpretation which was discussed   by Dr. Ramirez with Dr. Go on 1/9/2024 8:45 AM with read back.    --- End of Report ---          A/P: 73yFemale with lumbar stenosis  MRI lumbar spine showing  Weight bearing status- WBAT   Mobilize with physical therapy  Pain control    Patient history, exam, imaging and plan discussed with Dr. Jiang who is in agreement          Ortho Pager [phone] Orthopaedic Surgery Consult Note    CC: Patient is a 73y old  Female who presents with a chief complaint of Further mgmt of back pain and PT boubacar (08 Jan 2024 07:27)      HPI: 73yoF with pmhx chronic back pain after work injury in 1986 presented to ED after gradual worsening of LLE pain over the weekend. Pt denies trauma or injury or falls. Reports pain started Friday night, and progressed until Sunday morning when she was unable to ambulate upon waking up and called EMS. Pt describes LLE pain as a pulling sensation of her left anterior thigh radiating into her groin and the lateral aspect of her anterior thigh as well worse with attempting to put weight on her LLE or lifting leg to get out of the bed. Denies numbness or tingling into bilateral lower extremities. Denies bladder/bowel incontinence or retention although states she has not been eating a lot here so has not had BM since admission. Denies saddle anesthesia. Pt states she has been unable to walk since Sunday. Upon chart review, documented 30ft ambulating with RW with PT. Pt states since admission, pain has remained the same, although IV medication helped this morning. Pt states back pain has been managed by her PCP. She takes baclofen po 100mg and meloxicam 7.5mg oral as needed for the pain. She states she is normally able to ambulate >20 blocks with her RW although admits to having a fall in November due to her RW brake malfunctioning. She has participated in 3 months of physical therapy for her back this past fall. She also has a lumbar brace she is unsure of name that she wears prn. Denies previous spine surgeries and states surgery was recommended at time of initial injury however pt deferred and states she "does not want surgery unless it is life threatening."     Denies fever, chills, CP.       PCP: Dr Bill  - - - - -    HPI: This is a 74 y/o f hx HTN, sciatica, breast CA (remission) presents c/o left buttock pain radiating to left leg today.  Pt stating she feels "pulling" in the area which feels similar to muscle spasms she has had in the past.  Pt stating her symptoms started gradually 2 days ago and has worsened since then.  Pt took gabapentin without improvement.  Pt stating today she hasn't been able to walk, tried getting out of bed but wasn't able to take a step due to pain so called EMS.  Pt denies numbness/tingling to ext, weakness, saddle anesthesia, bowel/bladder incontinence, trauma, fall, all other ROS negative    ROS: Positive for above  All other systems negative, except for above.     Allergies  No Known Allergies      PAST MEDICAL & SURGICAL HISTORY:  Breast cancer  Sciatica        Social History: has a home health aide that comes 9-1 3x a week  Former smoker  Lives in public housing  With HHA 3d/week  [employer] building (07 Jan 2024 14:52)    FAMILY HISTORY: noncontributory    Medications: see med rec    Vital Signs Last 24 Hrs  T(C): 37.2 (09 Jan 2024 08:37), Max: 37.2 (09 Jan 2024 08:37)  T(F): 99 (09 Jan 2024 08:37), Max: 99 (09 Jan 2024 08:37)  HR: 81 (09 Jan 2024 08:37) (81 - 90)  BP: 137/78 (09 Jan 2024 08:37) (123/66 - 159/74)  BP(mean): --  RR: 18 (09 Jan 2024 08:37) (16 - 18)  SpO2: 97% (09 Jan 2024 08:37) (95% - 98%)    Parameters below as of 09 Jan 2024 08:37  Patient On (Oxygen Delivery Method): room air        PE:  VS: stable, reviewed per nursing documentation  General: No acute distress, resting comfortably  Neuro: Alert, oriented x 3  Psych: Normal mood & affect  HEENT: normocephalic, atraumatic   Neck: trachea midline  Respiratory: nonlabored on room air   CV: no cyanosis, no peripheral edema   Skin: Warm, dry, no rash, no lesions, no abrasions no ecchymosis   MSK: atraumatic with exception of below  Lumbar spine    -Inspection/palpation: no swelling, warmth, deformity of cervical, thoracic or lumbar spine appreciated, nontender to palpation over the cervical, thoracic, lumbar spine or paraspinal muscles    -Compartments: soft, nontender bilaterally     -Motor: Unable to SLR without assistance LLE, able to SLR on RLE, able to hold left knee flexed against resistance, TA/GS/EHL/FHL 5/5 bilateral lower extremities     -Sensation: intact to light touch bilateral lower extremities    -Pulses: 2+ DP pulses bilaterally, symmetric, extremities warm and well perfused, capillary refill brisk                            13.4   12.05 )-----------( 227      ( 09 Jan 2024 05:30 )             40.4     01-09    135  |  100  |  11  ----------------------------<  105<H>  4.2   |  25  |  0.74    Ca    9.6      09 Jan 2024 05:30  Phos  3.5     01-09  Mg     2.2     01-09          Imaging:   CT Lumbar spine without contrast 1/7/24  IMPRESSION:    1.  No evidence of acute osseous fracture or jacy dislocation.  2.  Multilevel degenerative change of lumbar spine, most significantly at   the L2-L3 level where there is moderate to severe narrowing of the spinal   canal. In the setting of neuropathy, recommend MRI of the lumbar spine   for further evaluation.  3.  Other abdominal findings as discussed above.    --- End of Report ---    MRI Lumbar spine without contrast 1/8/24  IMPRESSION:    L2-L3 moderate to severe spinal canal stenosis and left lateral recess   narrowing with mass effect on the left greater than the right descending   nerve roots.    L4-L5 moderate to severe left and moderate right neural foraminal   narrowing with mild to moderate spinal canal stenosis.    There is a change from the preliminary interpretation which was discussed   by Dr. Ramirez with Dr. Go on 1/9/2024 8:45 AM with read back.    --- End of Report ---          A/P: 73yFemale with lumbar stenosis  MRI lumbar spine showing  Weight bearing status- WBAT   Mobilize with physical therapy  Pain control    Patient history, exam, imaging and plan discussed with Dr. Jiang who is in agreement          Ortho Pager [phone] Orthopaedic Surgery Consult Note    CC: Patient is a 73y old  Female who presents with a chief complaint of Further mgmt of back pain and PT boubacar (08 Jan 2024 07:27)    HPI: 73yoF with pmhx chronic back pain after work injury in 1986 presented to ED after gradual worsening of LLE pain over the weekend. Pt denies trauma, injury or falls. Reports pain started Friday night, and progressed until Sunday morning when she was unable to ambulate upon waking up due to pain in the LLE and called EMS. Pt describes LLE pain as a pulling sensation of her left anterior thigh radiating into her groin and the lateral aspect of her anterior thigh as well worse with attempting to put weight on her LLE or lifting leg to get out of the bed. Denies numbness or tingling into bilateral lower extremities. Denies bladder/bowel incontinence or retention although states she has not been eating a lot here so has not had BM since admission. Denies saddle anesthesia. Pt states she has been unable to walk since Sunday. Upon chart review, documented 30ft ambulating with RW with PT. Pt states since admission, pain has remained the same, although medications helped this morning. Pt states back pain has been managed by her PCP. She takes baclofen po 100mg and meloxicam 7.5mg oral as needed for the pain. She states she is normally able to ambulate >20 blocks with her RW although admits to having a fall in November due to her RW brake malfunctioning. She has participated in 3 months of physical therapy for her back this past fall. She also has a lumbar brace she is unsure of name that she wears prn. Denies previous spine surgeries and states surgery was recommended at time of initial injury however pt deferred and states she "does not want surgery unless it is life threatening."     Denies fever, chills, CP.     PCP: Dr Bill  - - - - -  Per medicine H&P:   "HPI: This is a 74 y/o f hx HTN, sciatica, breast CA (remission) presents c/o left buttock pain radiating to left leg today.  Pt stating she feels "pulling" in the area which feels similar to muscle spasms she has had in the past.  Pt stating her symptoms started gradually 2 days ago and has worsened since then.  Pt took gabapentin without improvement.  Pt stating today she hasn't been able to walk, tried getting out of bed but wasn't able to take a step due to pain so called EMS.  Pt denies numbness/tingling to ext, weakness, saddle anesthesia, bowel/bladder incontinence, trauma, fall, all other ROS negative"    ROS: Positive for above  All other systems negative, except for above.     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)    FAMILY HISTORY: noncontributory    Medications: see med rec    Vital Signs Last 24 Hrs  T(C): 37.2 (09 Jan 2024 08:37), Max: 37.2 (09 Jan 2024 08:37)  T(F): 99 (09 Jan 2024 08:37), Max: 99 (09 Jan 2024 08:37)  HR: 81 (09 Jan 2024 08:37) (81 - 90)  BP: 137/78 (09 Jan 2024 08:37) (123/66 - 159/74)  BP(mean): --  RR: 18 (09 Jan 2024 08:37) (16 - 18)  SpO2: 97% (09 Jan 2024 08:37) (95% - 98%)    Parameters below as of 09 Jan 2024 08:37  Patient On (Oxygen Delivery Method): room air      PE:  VS: stable, reviewed per nursing documentation  General: No acute distress, resting comfortably  Neuro: Alert, oriented x 3  Psych: Normal mood & affect  HEENT: normocephalic, atraumatic   Neck: trachea midline  Respiratory: nonlabored on room air   CV: no cyanosis, no peripheral edema   Skin: Warm, dry, no rash, no lesions, no abrasions no ecchymosis   MSK: atraumatic with exception of below  Lumbar spine    -Inspection/palpation: no swelling, warmth, deformity of cervical, thoracic or lumbar spine appreciated, nontender to palpation over the cervical, thoracic, lumbar spine or paraspinal muscles    -Compartments: soft, nontender bilaterally     -Motor: Unable to SLR without assistance LLE, able to SLR on RLE, able to hold left knee flexed against resistance, TA/GS/EHL/FHL 5/5 bilateral lower extremities     -Sensation: intact to light touch bilateral lower extremities    -Pulses: 2+ DP pulses bilaterally, symmetric, extremities warm and well perfused, capillary refill brisk                            13.4   12.05 )-----------( 227      ( 09 Jan 2024 05:30 )             40.4     01-09    135  |  100  |  11  ----------------------------<  105<H>  4.2   |  25  |  0.74    Ca    9.6      09 Jan 2024 05:30  Phos  3.5     01-09  Mg     2.2     01-09          Imaging:   CT Lumbar spine without contrast 1/7/24  IMPRESSION:    1.  No evidence of acute osseous fracture or jacy dislocation.  2.  Multilevel degenerative change of lumbar spine, most significantly at   the L2-L3 level where there is moderate to severe narrowing of the spinal   canal. In the setting of neuropathy, recommend MRI of the lumbar spine   for further evaluation.  3.  Other abdominal findings as discussed above.    --- End of Report ---    MRI Lumbar spine without contrast 1/8/24  IMPRESSION:    L2-L3 moderate to severe spinal canal stenosis and left lateral recess   narrowing with mass effect on the left greater than the right descending   nerve roots.    L4-L5 moderate to severe left and moderate right neural foraminal   narrowing with mild to moderate spinal canal stenosis.    There is a change from the preliminary interpretation which was discussed   by Dr. Ramirez with Dr. Go on 1/9/2024 8:45 AM with read back.    --- End of Report ---      A/P: 73yFemale with L2-L3 and L4-L5 lumbar stenosis  MRI lumbar spine showing severe lumbar stenosis  Weight bearing status- WBAT   Mobilize with physical therapy  Pain control  Patient history, exam, imaging and plan discussed with Dr. Jiang, plan pending discussion between Dr. Jiang and patient tonight for surgical intervention vs outpatient injection      Ortho Pager [phone] Orthopaedic Surgery Consult Note    CC: Patient is a 73y old  Female who presents with a chief complaint of Further mgmt of back pain and PT boubacar (08 Jan 2024 07:27)    HPI: 73yoF with pmhx chronic back pain after work injury in 1986 presented to ED after gradual worsening of LLE pain over the weekend. Pt denies trauma, injury or falls. Reports pain started Friday night, and progressed until Sunday morning when she was unable to ambulate upon waking up due to pain in the LLE and called EMS. Pt describes LLE pain as a pulling sensation of her left anterior thigh radiating into her groin and the lateral aspect of her anterior thigh as well worse with attempting to put weight on her LLE or lifting leg to get out of the bed. Denies numbness or tingling into bilateral lower extremities. Denies bladder/bowel incontinence or retention although states she has not been eating a lot here so has not had BM since admission. Denies saddle anesthesia. Pt states she has been unable to walk since Sunday. Upon chart review, documented 30ft ambulating with RW with PT. Pt states since admission, pain has remained the same, although medications helped this morning. Pt states back pain has been managed by her PCP. She takes baclofen po 100mg and meloxicam 7.5mg oral as needed for the pain. She states she is normally able to ambulate >20 blocks with her RW although admits to having a fall in November due to her RW brake malfunctioning. She has participated in 3 months of physical therapy for her back this past fall. She also has a lumbar brace she is unsure of name that she wears prn. Denies previous spine surgeries and states surgery was recommended at time of initial injury however pt deferred and states she "does not want surgery unless it is life threatening."     Denies fever, chills, CP.     PCP: Dr Bill  - - - - -  Per medicine H&P:   "HPI: This is a 74 y/o f hx HTN, sciatica, breast CA (remission) presents c/o left buttock pain radiating to left leg today.  Pt stating she feels "pulling" in the area which feels similar to muscle spasms she has had in the past.  Pt stating her symptoms started gradually 2 days ago and has worsened since then.  Pt took gabapentin without improvement.  Pt stating today she hasn't been able to walk, tried getting out of bed but wasn't able to take a step due to pain so called EMS.  Pt denies numbness/tingling to ext, weakness, saddle anesthesia, bowel/bladder incontinence, trauma, fall, all other ROS negative"    ROS: Positive for above  All other systems negative, except for above.     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)    FAMILY HISTORY: noncontributory    Medications: see med rec    Vital Signs Last 24 Hrs  T(C): 37.2 (09 Jan 2024 08:37), Max: 37.2 (09 Jan 2024 08:37)  T(F): 99 (09 Jan 2024 08:37), Max: 99 (09 Jan 2024 08:37)  HR: 81 (09 Jan 2024 08:37) (81 - 90)  BP: 137/78 (09 Jan 2024 08:37) (123/66 - 159/74)  BP(mean): --  RR: 18 (09 Jan 2024 08:37) (16 - 18)  SpO2: 97% (09 Jan 2024 08:37) (95% - 98%)    Parameters below as of 09 Jan 2024 08:37  Patient On (Oxygen Delivery Method): room air      PE:  VS: stable, reviewed per nursing documentation  General: No acute distress, resting comfortably  Neuro: Alert, oriented x 3  Psych: Normal mood & affect  HEENT: normocephalic, atraumatic   Neck: trachea midline  Respiratory: nonlabored on room air   CV: no cyanosis, no peripheral edema   Skin: Warm, dry, no rash, no lesions, no abrasions no ecchymosis   MSK: atraumatic with exception of below  Lumbar spine    -Inspection/palpation: no swelling, warmth, deformity of cervical, thoracic or lumbar spine appreciated, nontender to palpation over the cervical, thoracic, lumbar spine or paraspinal muscles    -Compartments: soft, nontender bilaterally     -Motor: Unable to SLR without assistance LLE, able to SLR on RLE, able to hold left knee flexed against resistance, TA/GS/EHL/FHL 5/5 bilateral lower extremities     -Sensation: intact to light touch bilateral lower extremities    -Pulses: 2+ DP pulses bilaterally, symmetric, extremities warm and well perfused, capillary refill brisk                            13.4   12.05 )-----------( 227      ( 09 Jan 2024 05:30 )             40.4     01-09    135  |  100  |  11  ----------------------------<  105<H>  4.2   |  25  |  0.74    Ca    9.6      09 Jan 2024 05:30  Phos  3.5     01-09  Mg     2.2     01-09          Imaging:   CT Lumbar spine without contrast 1/7/24  IMPRESSION:    1.  No evidence of acute osseous fracture or jacy dislocation.  2.  Multilevel degenerative change of lumbar spine, most significantly at   the L2-L3 level where there is moderate to severe narrowing of the spinal   canal. In the setting of neuropathy, recommend MRI of the lumbar spine   for further evaluation.  3.  Other abdominal findings as discussed above.    --- End of Report ---    MRI Lumbar spine without contrast 1/8/24  IMPRESSION:    L2-L3 moderate to severe spinal canal stenosis and left lateral recess   narrowing with mass effect on the left greater than the right descending   nerve roots.    L4-L5 moderate to severe left and moderate right neural foraminal   narrowing with mild to moderate spinal canal stenosis.    There is a change from the preliminary interpretation which was discussed   by Dr. Ramirez with Dr. Go on 1/9/2024 8:45 AM with read back.    --- End of Report ---      A/P: 73yFemale with L2-L3 and L4-L5 lumbar stenosis  MRI lumbar spine showing severe lumbar stenosis  Weight bearing status- WBAT   Mobilize with physical therapy  Pain control  Patient history, exam, imaging and plan discussed with Dr. Jiang, plan pending discussion between Dr. Jiang and patient tonight for surgical intervention vs outpatient injection      Ortho Pager [phone] Orthopaedic Surgery Consult Note    CC: Patient is a 73y old  Female who presents with a chief complaint of Further mgmt of back pain and PT boubacar (08 Jan 2024 07:27)    HPI: 73yoF with pmhx chronic back pain after work injury in 1986 presented to ED after gradual worsening of LLE pain over the weekend. Pt denies trauma, injury or falls. Reports pain started Friday night, and progressed until Sunday morning when she was unable to ambulate upon waking up due to pain in the LLE and called EMS. Pt describes LLE pain as a pulling sensation of her left anterior thigh radiating into her groin and the lateral aspect of her anterior thigh as well worse with attempting to put weight on her LLE or lifting leg to get out of the bed. Denies numbness or tingling into bilateral lower extremities. Denies bladder/bowel incontinence or retention although states she has not been eating a lot here so has not had BM since admission. Denies saddle anesthesia. Pt states she has been unable to walk since Sunday. Upon chart review, documented 30ft ambulating with RW with PT. Pt states since admission, pain has remained the same, although medications helped this morning. Pt states back pain has been managed by her PCP. She takes baclofen po 100mg and meloxicam 7.5mg oral as needed for the pain. She states she is normally able to ambulate >20 blocks with her RW although admits to having a fall in November due to her RW brake malfunctioning. She has participated in 3 months of physical therapy for her back this past fall. She also has a lumbar brace she is unsure of name that she wears prn. Denies previous spine surgeries and states surgery was recommended at time of initial injury however pt deferred and states she "does not want surgery unless it is life threatening."     Denies fever, chills, CP.     PCP: Dr Bill  - - - - -  Per medicine H&P:   "HPI: This is a 74 y/o f hx HTN, sciatica, breast CA (remission) presents c/o left buttock pain radiating to left leg today.  Pt stating she feels "pulling" in the area which feels similar to muscle spasms she has had in the past.  Pt stating her symptoms started gradually 2 days ago and has worsened since then.  Pt took gabapentin without improvement.  Pt stating today she hasn't been able to walk, tried getting out of bed but wasn't able to take a step due to pain so called EMS.  Pt denies numbness/tingling to ext, weakness, saddle anesthesia, bowel/bladder incontinence, trauma, fall, all other ROS negative"    ROS: Positive for above  All other systems negative, except for above.     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)    FAMILY HISTORY: noncontributory    Medications: see med rec    Vital Signs Last 24 Hrs  T(C): 37.2 (09 Jan 2024 08:37), Max: 37.2 (09 Jan 2024 08:37)  T(F): 99 (09 Jan 2024 08:37), Max: 99 (09 Jan 2024 08:37)  HR: 81 (09 Jan 2024 08:37) (81 - 90)  BP: 137/78 (09 Jan 2024 08:37) (123/66 - 159/74)  BP(mean): --  RR: 18 (09 Jan 2024 08:37) (16 - 18)  SpO2: 97% (09 Jan 2024 08:37) (95% - 98%)    Parameters below as of 09 Jan 2024 08:37  Patient On (Oxygen Delivery Method): room air      PE:  VS: stable, reviewed per nursing documentation  General: No acute distress, resting comfortably  Neuro: Alert, oriented x 3  Psych: Normal mood & affect  HEENT: normocephalic, atraumatic   Neck: trachea midline  Respiratory: nonlabored on room air   CV: no cyanosis, no peripheral edema   Skin: Warm, dry, no rash, no lesions, no abrasions no ecchymosis   MSK: atraumatic with exception of below  Lumbar spine    -Inspection/palpation: no swelling, warmth, deformity of cervical, thoracic or lumbar spine appreciated, nontender to palpation over the cervical, thoracic, lumbar spine and paraspinal muscles    -Compartments: soft, nontender bilaterally     -Motor: Unable to SLR without assistance LLE, able to SLR on RLE, able to hold left knee flexed against resistance, TA/GS/EHL/FHL 5/5 bilateral lower extremities     -Sensation: intact to light touch bilateral lower extremities    -Pulses: 2+ DP pulses bilaterally, symmetric, extremities warm and well perfused, capillary refill brisk                            13.4   12.05 )-----------( 227      ( 09 Jan 2024 05:30 )             40.4     01-09    135  |  100  |  11  ----------------------------<  105<H>  4.2   |  25  |  0.74    Ca    9.6      09 Jan 2024 05:30  Phos  3.5     01-09  Mg     2.2     01-09          Imaging:   CT Lumbar spine without contrast 1/7/24  IMPRESSION:    1.  No evidence of acute osseous fracture or jacy dislocation.  2.  Multilevel degenerative change of lumbar spine, most significantly at   the L2-L3 level where there is moderate to severe narrowing of the spinal   canal. In the setting of neuropathy, recommend MRI of the lumbar spine   for further evaluation.  3.  Other abdominal findings as discussed above.    --- End of Report ---    MRI Lumbar spine without contrast 1/8/24  IMPRESSION:    L2-L3 moderate to severe spinal canal stenosis and left lateral recess   narrowing with mass effect on the left greater than the right descending   nerve roots.    L4-L5 moderate to severe left and moderate right neural foraminal   narrowing with mild to moderate spinal canal stenosis.    There is a change from the preliminary interpretation which was discussed   by Dr. Ramirez with Dr. Go on 1/9/2024 8:45 AM with read back.    --- End of Report ---      A/P: 73yFemale with L2-L3 and L4-L5 lumbar stenosis  MRI lumbar spine showing severe lumbar stenosis  Weight bearing status- WBAT   Mobilize with physical therapy  Pain control  Patient history, exam, imaging and plan discussed with Dr. Jiang, plan pending discussion between Dr. Jiang and patient tonight for surgical intervention vs outpatient injection      Ortho Pager [phone] Orthopaedic Surgery Consult Note    CC: Patient is a 73y old  Female who presents with a chief complaint of Further mgmt of back pain and PT boubacar (08 Jan 2024 07:27)    HPI: 73yoF with pmhx chronic back pain after work injury in 1986 presented to ED after gradual worsening of LLE pain over the weekend. Pt denies trauma, injury or falls. Reports pain started Friday night, and progressed until Sunday morning when she was unable to ambulate upon waking up due to pain in the LLE and called EMS. Pt describes LLE pain as a pulling sensation of her left anterior thigh radiating into her groin and the lateral aspect of her anterior thigh as well worse with attempting to put weight on her LLE or lifting leg to get out of the bed. Denies numbness or tingling into bilateral lower extremities. Denies bladder/bowel incontinence or retention although states she has not been eating a lot here so has not had BM since admission. Denies saddle anesthesia. Pt states she has been unable to walk since Sunday. Upon chart review, documented 30ft ambulating with RW with PT. Pt states since admission, pain has remained the same, although medications helped this morning. Pt states back pain has been managed by her PCP. She takes baclofen po 100mg and meloxicam 7.5mg oral as needed for the pain. She states she is normally able to ambulate >20 blocks with her RW although admits to having a fall in November due to her RW brake malfunctioning. She has participated in 3 months of physical therapy for her back this past fall. She also has a lumbar brace she is unsure of name that she wears prn. Denies previous spine surgeries and states surgery was recommended at time of initial injury however pt deferred and states she "does not want surgery unless it is life threatening."     Denies fever, chills, CP.     PCP: Dr Bill  - - - - -  Per medicine H&P:   "HPI: This is a 72 y/o f hx HTN, sciatica, breast CA (remission) presents c/o left buttock pain radiating to left leg today.  Pt stating she feels "pulling" in the area which feels similar to muscle spasms she has had in the past.  Pt stating her symptoms started gradually 2 days ago and has worsened since then.  Pt took gabapentin without improvement.  Pt stating today she hasn't been able to walk, tried getting out of bed but wasn't able to take a step due to pain so called EMS.  Pt denies numbness/tingling to ext, weakness, saddle anesthesia, bowel/bladder incontinence, trauma, fall, all other ROS negative"    ROS: Positive for above  All other systems negative, except for above.     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)    FAMILY HISTORY: noncontributory    Medications: see med rec    Vital Signs Last 24 Hrs  T(C): 37.2 (09 Jan 2024 08:37), Max: 37.2 (09 Jan 2024 08:37)  T(F): 99 (09 Jan 2024 08:37), Max: 99 (09 Jan 2024 08:37)  HR: 81 (09 Jan 2024 08:37) (81 - 90)  BP: 137/78 (09 Jan 2024 08:37) (123/66 - 159/74)  BP(mean): --  RR: 18 (09 Jan 2024 08:37) (16 - 18)  SpO2: 97% (09 Jan 2024 08:37) (95% - 98%)    Parameters below as of 09 Jan 2024 08:37  Patient On (Oxygen Delivery Method): room air      PE:  VS: stable, reviewed per nursing documentation  General: No acute distress, resting comfortably  Neuro: Alert, oriented x 3  Psych: Normal mood & affect  HEENT: normocephalic, atraumatic   Neck: trachea midline  Respiratory: nonlabored on room air   CV: no cyanosis, no peripheral edema   Skin: Warm, dry, no rash, no lesions, no abrasions no ecchymosis   MSK: atraumatic with exception of below  Lumbar spine    -Inspection/palpation: no swelling, warmth, deformity of cervical, thoracic or lumbar spine appreciated, nontender to palpation over the cervical, thoracic, lumbar spine and paraspinal muscles    -Compartments: soft, nontender bilaterally     -Motor: Unable to SLR without assistance LLE, able to SLR on RLE, able to hold left knee flexed against resistance, TA/GS/EHL/FHL 5/5 bilateral lower extremities     -Sensation: intact to light touch bilateral lower extremities    -Pulses: 2+ DP pulses bilaterally, symmetric, extremities warm and well perfused, capillary refill brisk                            13.4   12.05 )-----------( 227      ( 09 Jan 2024 05:30 )             40.4     01-09    135  |  100  |  11  ----------------------------<  105<H>  4.2   |  25  |  0.74    Ca    9.6      09 Jan 2024 05:30  Phos  3.5     01-09  Mg     2.2     01-09          Imaging:   CT Lumbar spine without contrast 1/7/24  IMPRESSION:    1.  No evidence of acute osseous fracture or jacy dislocation.  2.  Multilevel degenerative change of lumbar spine, most significantly at   the L2-L3 level where there is moderate to severe narrowing of the spinal   canal. In the setting of neuropathy, recommend MRI of the lumbar spine   for further evaluation.  3.  Other abdominal findings as discussed above.    --- End of Report ---    MRI Lumbar spine without contrast 1/8/24  IMPRESSION:    L2-L3 moderate to severe spinal canal stenosis and left lateral recess   narrowing with mass effect on the left greater than the right descending   nerve roots.    L4-L5 moderate to severe left and moderate right neural foraminal   narrowing with mild to moderate spinal canal stenosis.    There is a change from the preliminary interpretation which was discussed   by Dr. Ramirez with Dr. Go on 1/9/2024 8:45 AM with read back.    --- End of Report ---      A/P: 73yFemale with L2-L3 and L4-L5 lumbar stenosis  MRI lumbar spine showing severe lumbar stenosis  Weight bearing status- WBAT   Mobilize with physical therapy  Pain control  Patient history, exam, imaging and plan discussed with Dr. Jiang, plan pending discussion between Dr. Jiang and patient tonight for surgical intervention vs outpatient injection      Ortho Pager [phone] Orthopaedic Surgery Consult Note    CC: Patient is a 73y old  Female who presents with a chief complaint of Further mgmt of back pain and PT boubacar (08 Jan 2024 07:27)    HPI: 73yoF with pmhx chronic back pain after work injury in 1986 presented to ED after gradual worsening of LLE pain over the weekend. Pt denies trauma, injury or falls. Reports pain started Friday night, and progressed until Sunday morning when she was unable to ambulate upon waking up due to pain in the LLE and called EMS. Pt describes LLE pain as a pulling sensation of her left anterior thigh radiating into her groin and the lateral aspect of her anterior thigh as well worse with attempting to put weight on her LLE or lifting leg to get out of the bed. Denies numbness or tingling into bilateral lower extremities. Denies bladder/bowel incontinence or retention although states she has not been eating a lot here so has not had BM since admission. Denies saddle anesthesia. Pt states she has been unable to walk since Sunday. Upon chart review, documented 30ft ambulating with RW with PT. Pt states since admission, pain has remained the same, although medications helped this morning. Pt states back pain has been managed by her PCP. She takes baclofen po 100mg and meloxicam 15mg oral as needed for the pain. She states she is normally able to ambulate >20 blocks with her RW although admits to having a fall in November due to her RW brake malfunctioning. She has participated in 3 months of physical therapy for her back this past fall. She also has a lumbar brace she is unsure of name that she wears prn. Denies previous spine surgeries and states surgery was recommended at time of initial injury however pt deferred and states she "does not want surgery unless it is life threatening."     Denies fever, chills, CP.     PCP: Dr Bill  - - - - -  Per medicine H&P:   "HPI: This is a 72 y/o f hx HTN, sciatica, breast CA (remission) presents c/o left buttock pain radiating to left leg today.  Pt stating she feels "pulling" in the area which feels similar to muscle spasms she has had in the past.  Pt stating her symptoms started gradually 2 days ago and has worsened since then.  Pt took gabapentin without improvement.  Pt stating today she hasn't been able to walk, tried getting out of bed but wasn't able to take a step due to pain so called EMS.  Pt denies numbness/tingling to ext, weakness, saddle anesthesia, bowel/bladder incontinence, trauma, fall, all other ROS negative"    ROS: Positive for above  All other systems negative, except for above.     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)    FAMILY HISTORY: noncontributory    Medications: see med rec    Vital Signs Last 24 Hrs  T(C): 37.2 (09 Jan 2024 08:37), Max: 37.2 (09 Jan 2024 08:37)  T(F): 99 (09 Jan 2024 08:37), Max: 99 (09 Jan 2024 08:37)  HR: 81 (09 Jan 2024 08:37) (81 - 90)  BP: 137/78 (09 Jan 2024 08:37) (123/66 - 159/74)  BP(mean): --  RR: 18 (09 Jan 2024 08:37) (16 - 18)  SpO2: 97% (09 Jan 2024 08:37) (95% - 98%)    Parameters below as of 09 Jan 2024 08:37  Patient On (Oxygen Delivery Method): room air      PE:  VS: stable, reviewed per nursing documentation  General: No acute distress, resting comfortably  Neuro: Alert, oriented x 3  Psych: Normal mood & affect  HEENT: normocephalic, atraumatic   Neck: trachea midline  Respiratory: nonlabored on room air   CV: no cyanosis, no peripheral edema   Skin: Warm, dry, no rash, no lesions, no abrasions no ecchymosis   MSK: atraumatic with exception of below  Lumbar spine    -Inspection/palpation: no swelling, warmth, deformity of cervical, thoracic or lumbar spine appreciated, nontender to palpation over the cervical, thoracic, lumbar spine and paraspinal muscles    -Compartments: soft, nontender bilaterally     -Motor: Unable to SLR without assistance LLE, able to SLR on RLE, able to hold left knee flexed against resistance, TA/GS/EHL/FHL 5/5 bilateral lower extremities     -Sensation: intact to light touch bilateral lower extremities    -Pulses: 2+ DP pulses bilaterally, symmetric, extremities warm and well perfused, capillary refill brisk                            13.4   12.05 )-----------( 227      ( 09 Jan 2024 05:30 )             40.4     01-09    135  |  100  |  11  ----------------------------<  105<H>  4.2   |  25  |  0.74    Ca    9.6      09 Jan 2024 05:30  Phos  3.5     01-09  Mg     2.2     01-09          Imaging:   CT Lumbar spine without contrast 1/7/24  IMPRESSION:    1.  No evidence of acute osseous fracture or jacy dislocation.  2.  Multilevel degenerative change of lumbar spine, most significantly at   the L2-L3 level where there is moderate to severe narrowing of the spinal   canal. In the setting of neuropathy, recommend MRI of the lumbar spine   for further evaluation.  3.  Other abdominal findings as discussed above.    --- End of Report ---    MRI Lumbar spine without contrast 1/8/24  IMPRESSION:    L2-L3 moderate to severe spinal canal stenosis and left lateral recess   narrowing with mass effect on the left greater than the right descending   nerve roots.    L4-L5 moderate to severe left and moderate right neural foraminal   narrowing with mild to moderate spinal canal stenosis.    There is a change from the preliminary interpretation which was discussed   by Dr. Ramirez with Dr. Go on 1/9/2024 8:45 AM with read back.    --- End of Report ---      A/P: 73yFemale with L2-L3 and L4-L5 lumbar stenosis  MRI lumbar spine showing severe lumbar stenosis  Weight bearing status- WBAT   Mobilize with physical therapy  Pain control  Patient history, exam, imaging and plan discussed with Dr. Jiang, plan pending discussion between Dr. Jiang and patient tonight for surgical intervention vs outpatient injection      Ortho Pager [phone] Orthopaedic Surgery Consult Note    CC: Patient is a 73y old  Female who presents with a chief complaint of Further mgmt of back pain and PT boubacar (08 Jan 2024 07:27)    HPI: 73yoF with pmhx chronic back pain after work injury in 1986 presented to ED after gradual worsening of LLE pain over the weekend. Pt denies trauma, injury or falls. Reports pain started Friday night, and progressed until Sunday morning when she was unable to ambulate upon waking up due to pain in the LLE and called EMS. Pt describes LLE pain as a pulling sensation of her left anterior thigh radiating into her groin and the lateral aspect of her anterior thigh as well worse with attempting to put weight on her LLE or lifting leg to get out of the bed. Denies numbness or tingling into bilateral lower extremities. Denies bladder/bowel incontinence or retention although states she has not been eating a lot here so has not had BM since admission. Denies saddle anesthesia. Pt states she has been unable to walk since Sunday. Upon chart review, documented 30ft ambulating with RW with PT. Pt states since admission, pain has remained the same, although medications helped this morning. Pt states back pain has been managed by her PCP. She takes baclofen po 100mg and meloxicam 15mg oral as needed for the pain. She states she is normally able to ambulate >20 blocks with her RW although admits to having a fall in November due to her RW brake malfunctioning. She has participated in 3 months of physical therapy for her back this past fall. She also has a lumbar brace she is unsure of name that she wears prn. Denies previous spine surgeries and states surgery was recommended at time of initial injury however pt deferred and states she "does not want surgery unless it is life threatening."     Denies fever, chills, CP.     PCP: Dr Bill  - - - - -  Per medicine H&P:   "HPI: This is a 72 y/o f hx HTN, sciatica, breast CA (remission) presents c/o left buttock pain radiating to left leg today.  Pt stating she feels "pulling" in the area which feels similar to muscle spasms she has had in the past.  Pt stating her symptoms started gradually 2 days ago and has worsened since then.  Pt took gabapentin without improvement.  Pt stating today she hasn't been able to walk, tried getting out of bed but wasn't able to take a step due to pain so called EMS.  Pt denies numbness/tingling to ext, weakness, saddle anesthesia, bowel/bladder incontinence, trauma, fall, all other ROS negative"    ROS: Positive for above  All other systems negative, except for above.     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)    FAMILY HISTORY: noncontributory    Medications: see med rec    Vital Signs Last 24 Hrs  T(C): 37.2 (09 Jan 2024 08:37), Max: 37.2 (09 Jan 2024 08:37)  T(F): 99 (09 Jan 2024 08:37), Max: 99 (09 Jan 2024 08:37)  HR: 81 (09 Jan 2024 08:37) (81 - 90)  BP: 137/78 (09 Jan 2024 08:37) (123/66 - 159/74)  BP(mean): --  RR: 18 (09 Jan 2024 08:37) (16 - 18)  SpO2: 97% (09 Jan 2024 08:37) (95% - 98%)    Parameters below as of 09 Jan 2024 08:37  Patient On (Oxygen Delivery Method): room air      PE:  VS: stable, reviewed per nursing documentation  General: No acute distress, resting comfortably  Neuro: Alert, oriented x 3  Psych: Normal mood & affect  HEENT: normocephalic, atraumatic   Neck: trachea midline  Respiratory: nonlabored on room air   CV: no cyanosis, no peripheral edema   Skin: Warm, dry, no rash, no lesions, no abrasions no ecchymosis   MSK: atraumatic with exception of below  Lumbar spine    -Inspection/palpation: no swelling, warmth, deformity of cervical, thoracic or lumbar spine appreciated, nontender to palpation over the cervical, thoracic, lumbar spine and paraspinal muscles    -Compartments: soft, nontender bilaterally     -Motor: Unable to SLR without assistance LLE, able to SLR on RLE, able to hold left knee flexed against resistance, TA/GS/EHL/FHL 5/5 bilateral lower extremities     -Sensation: intact to light touch bilateral lower extremities    -Pulses: 2+ DP pulses bilaterally, symmetric, extremities warm and well perfused, capillary refill brisk                            13.4   12.05 )-----------( 227      ( 09 Jan 2024 05:30 )             40.4     01-09    135  |  100  |  11  ----------------------------<  105<H>  4.2   |  25  |  0.74    Ca    9.6      09 Jan 2024 05:30  Phos  3.5     01-09  Mg     2.2     01-09          Imaging:   CT Lumbar spine without contrast 1/7/24  IMPRESSION:    1.  No evidence of acute osseous fracture or jacy dislocation.  2.  Multilevel degenerative change of lumbar spine, most significantly at   the L2-L3 level where there is moderate to severe narrowing of the spinal   canal. In the setting of neuropathy, recommend MRI of the lumbar spine   for further evaluation.  3.  Other abdominal findings as discussed above.    --- End of Report ---    MRI Lumbar spine without contrast 1/8/24  IMPRESSION:    L2-L3 moderate to severe spinal canal stenosis and left lateral recess   narrowing with mass effect on the left greater than the right descending   nerve roots.    L4-L5 moderate to severe left and moderate right neural foraminal   narrowing with mild to moderate spinal canal stenosis.    There is a change from the preliminary interpretation which was discussed   by Dr. Ramirez with Dr. Go on 1/9/2024 8:45 AM with read back.    --- End of Report ---      A/P: 73yFemale with L2-L3 and L4-L5 lumbar stenosis  MRI lumbar spine showing severe lumbar stenosis  Weight bearing status- WBAT   Mobilize with physical therapy  Pain control  Patient history, exam, imaging and plan discussed with Dr. Jiang, plan pending discussion between Dr. Jiang and patient tonight for surgical intervention vs outpatient injection      Ortho Pager [phone] Orthopaedic Surgery Consult Note    CC: Patient is a 73y old  Female who presents with a chief complaint of Further mgmt of back pain and PT boubacar (08 Jan 2024 07:27)    HPI: 73yoF with pmhx chronic back pain after work injury in 1986 presented to ED after gradual worsening of LLE pain over the weekend. Pt denies trauma, injury or falls. Reports pain started Friday night, and progressed until Sunday morning when she was unable to ambulate upon waking up due to pain in the LLE and called EMS. Pt describes LLE pain as a pulling sensation of her left anterior thigh radiating into her groin and the lateral aspect of her anterior thigh as well worse with attempting to put weight on her LLE or lifting leg to get out of the bed. Denies numbness or tingling into bilateral lower extremities. Denies bladder/bowel incontinence or retention although states she has not been eating a lot here so has not had BM since admission. Denies saddle anesthesia. Pt states she has been unable to walk since Sunday. Upon chart review, documented 30ft ambulating with RW with PT. Pt states since admission, pain has remained the same, although states medications helped this morning. Pt states back pain has been managed by her PCP. She takes baclofen po 100mg and meloxicam 15mg oral as needed for the pain. She states she is normally able to ambulate >20 blocks with her RW although admits to having a fall in November due to her RW brake malfunctioning. She has participated in 3 months of physical therapy for her back this past fall. She also has a lumbar brace she is unsure of name that she wears prn. Denies previous spine surgeries and states surgery was recommended at time of initial injury however pt deferred and states she "does not want surgery unless it is life threatening."     Denies fever, chills, CP.     PCP: Dr Bill  - - - - -  Per medicine H&P:   "HPI: This is a 72 y/o f hx HTN, sciatica, breast CA (remission) presents c/o left buttock pain radiating to left leg today.  Pt stating she feels "pulling" in the area which feels similar to muscle spasms she has had in the past.  Pt stating her symptoms started gradually 2 days ago and has worsened since then.  Pt took gabapentin without improvement.  Pt stating today she hasn't been able to walk, tried getting out of bed but wasn't able to take a step due to pain so called EMS.  Pt denies numbness/tingling to ext, weakness, saddle anesthesia, bowel/bladder incontinence, trauma, fall, all other ROS negative"    ROS: Positive for above  All other systems negative, except for above.     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)    FAMILY HISTORY: noncontributory    Medications: see med rec    Vital Signs Last 24 Hrs  T(C): 37.2 (09 Jan 2024 08:37), Max: 37.2 (09 Jan 2024 08:37)  T(F): 99 (09 Jan 2024 08:37), Max: 99 (09 Jan 2024 08:37)  HR: 81 (09 Jan 2024 08:37) (81 - 90)  BP: 137/78 (09 Jan 2024 08:37) (123/66 - 159/74)  BP(mean): --  RR: 18 (09 Jan 2024 08:37) (16 - 18)  SpO2: 97% (09 Jan 2024 08:37) (95% - 98%)    Parameters below as of 09 Jan 2024 08:37  Patient On (Oxygen Delivery Method): room air      PE:  VS: stable, reviewed per nursing documentation  General: No acute distress, resting comfortably  Neuro: Alert, oriented x 3  Psych: Normal mood & affect  HEENT: normocephalic, atraumatic   Neck: trachea midline  Respiratory: nonlabored on room air   CV: no cyanosis, no peripheral edema   Skin: Warm, dry, no rash, no lesions, no abrasions no ecchymosis   MSK: atraumatic with exception of below  Lumbar spine    -Inspection/palpation: no swelling, warmth, deformity of cervical, thoracic or lumbar spine appreciated, nontender to palpation over the cervical, thoracic, lumbar spine and paraspinal muscles    -Compartments: soft, nontender bilaterally     -Motor: Unable to SLR without assistance LLE, able to SLR on RLE, able to hold left knee flexed against resistance, TA/GS/EHL/FHL 5/5 bilateral lower extremities     -Sensation: intact to light touch bilateral lower extremities    -Pulses: 2+ DP pulses bilaterally, symmetric, extremities warm and well perfused, capillary refill brisk                            13.4   12.05 )-----------( 227      ( 09 Jan 2024 05:30 )             40.4     01-09    135  |  100  |  11  ----------------------------<  105<H>  4.2   |  25  |  0.74    Ca    9.6      09 Jan 2024 05:30  Phos  3.5     01-09  Mg     2.2     01-09          Imaging:   CT Lumbar spine without contrast 1/7/24  IMPRESSION:    1.  No evidence of acute osseous fracture or jacy dislocation.  2.  Multilevel degenerative change of lumbar spine, most significantly at   the L2-L3 level where there is moderate to severe narrowing of the spinal   canal. In the setting of neuropathy, recommend MRI of the lumbar spine   for further evaluation.  3.  Other abdominal findings as discussed above.    --- End of Report ---    MRI Lumbar spine without contrast 1/8/24  IMPRESSION:    L2-L3 moderate to severe spinal canal stenosis and left lateral recess   narrowing with mass effect on the left greater than the right descending   nerve roots.    L4-L5 moderate to severe left and moderate right neural foraminal   narrowing with mild to moderate spinal canal stenosis.    There is a change from the preliminary interpretation which was discussed   by Dr. Ramirez with Dr. Go on 1/9/2024 8:45 AM with read back.    --- End of Report ---      A/P: 73yFemale with L2-L3 and L4-L5 lumbar stenosis  MRI lumbar spine showing severe lumbar stenosis at L2-L3 with mass effect on descending nerve roots  Weight bearing status- WBAT   Mobilize with physical therapy  Pain control  Patient history, exam, imaging and plan discussed with Dr. Jiang, plan pending discussion between Dr. Jiang and patient tonight for surgical intervention vs outpatient injection      Ortho Pager [phone] Orthopaedic Surgery Consult Note    CC: Patient is a 73y old  Female who presents with a chief complaint of Further mgmt of back pain and PT boubacar (08 Jan 2024 07:27)    HPI: 73yoF with pmhx chronic back pain after work injury in 1986 presented to ED after gradual worsening of LLE pain over the weekend. Pt denies trauma, injury or falls. Reports pain started Friday night, and progressed until Sunday morning when she was unable to ambulate upon waking up due to pain in the LLE and called EMS. Pt describes LLE pain as a pulling sensation of her left anterior thigh radiating into her groin and the lateral aspect of her anterior thigh as well worse with attempting to put weight on her LLE or lifting leg to get out of the bed. Denies numbness or tingling into bilateral lower extremities. Denies bladder/bowel incontinence or retention although states she has not been eating a lot here so has not had BM since admission. Denies saddle anesthesia. Pt states she has been unable to walk since Sunday. Upon chart review, documented 30ft ambulating with RW with PT. Pt states since admission, pain has remained the same, although states medications helped this morning. Pt states back pain has been managed by her PCP. She takes baclofen po 100mg and meloxicam 15mg oral as needed for the pain. She states she is normally able to ambulate >20 blocks with her RW although admits to having a fall in November due to her RW brake malfunctioning. She has participated in 3 months of physical therapy for her back this past fall. She also has a lumbar brace she is unsure of name that she wears prn. Denies previous spine surgeries and states surgery was recommended at time of initial injury however pt deferred and states she "does not want surgery unless it is life threatening."     Denies fever, chills, CP.     PCP: Dr Bill  - - - - -  Per medicine H&P:   "HPI: This is a 72 y/o f hx HTN, sciatica, breast CA (remission) presents c/o left buttock pain radiating to left leg today.  Pt stating she feels "pulling" in the area which feels similar to muscle spasms she has had in the past.  Pt stating her symptoms started gradually 2 days ago and has worsened since then.  Pt took gabapentin without improvement.  Pt stating today she hasn't been able to walk, tried getting out of bed but wasn't able to take a step due to pain so called EMS.  Pt denies numbness/tingling to ext, weakness, saddle anesthesia, bowel/bladder incontinence, trauma, fall, all other ROS negative"    ROS: Positive for above  All other systems negative, except for above.     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)    FAMILY HISTORY: noncontributory    Medications: see med rec    Vital Signs Last 24 Hrs  T(C): 37.2 (09 Jan 2024 08:37), Max: 37.2 (09 Jan 2024 08:37)  T(F): 99 (09 Jan 2024 08:37), Max: 99 (09 Jan 2024 08:37)  HR: 81 (09 Jan 2024 08:37) (81 - 90)  BP: 137/78 (09 Jan 2024 08:37) (123/66 - 159/74)  BP(mean): --  RR: 18 (09 Jan 2024 08:37) (16 - 18)  SpO2: 97% (09 Jan 2024 08:37) (95% - 98%)    Parameters below as of 09 Jan 2024 08:37  Patient On (Oxygen Delivery Method): room air      PE:  VS: stable, reviewed per nursing documentation  General: No acute distress, resting comfortably  Neuro: Alert, oriented x 3  Psych: Normal mood & affect  HEENT: normocephalic, atraumatic   Neck: trachea midline  Respiratory: nonlabored on room air   CV: no cyanosis, no peripheral edema   Skin: Warm, dry, no rash, no lesions, no abrasions no ecchymosis   MSK: atraumatic with exception of below  Lumbar spine    -Inspection/palpation: no swelling, warmth, deformity of cervical, thoracic or lumbar spine appreciated, nontender to palpation over the cervical, thoracic, lumbar spine and paraspinal muscles    -Compartments: soft, nontender bilaterally     -Motor: Unable to SLR without assistance LLE, able to SLR on RLE, able to hold left knee flexed against resistance, TA/GS/EHL/FHL 5/5 bilateral lower extremities     -Sensation: intact to light touch bilateral lower extremities    -Pulses: 2+ DP pulses bilaterally, symmetric, extremities warm and well perfused, capillary refill brisk                            13.4   12.05 )-----------( 227      ( 09 Jan 2024 05:30 )             40.4     01-09    135  |  100  |  11  ----------------------------<  105<H>  4.2   |  25  |  0.74    Ca    9.6      09 Jan 2024 05:30  Phos  3.5     01-09  Mg     2.2     01-09          Imaging:   CT Lumbar spine without contrast 1/7/24  IMPRESSION:    1.  No evidence of acute osseous fracture or jacy dislocation.  2.  Multilevel degenerative change of lumbar spine, most significantly at   the L2-L3 level where there is moderate to severe narrowing of the spinal   canal. In the setting of neuropathy, recommend MRI of the lumbar spine   for further evaluation.  3.  Other abdominal findings as discussed above.    --- End of Report ---    MRI Lumbar spine without contrast 1/8/24  IMPRESSION:    L2-L3 moderate to severe spinal canal stenosis and left lateral recess   narrowing with mass effect on the left greater than the right descending   nerve roots.    L4-L5 moderate to severe left and moderate right neural foraminal   narrowing with mild to moderate spinal canal stenosis.    There is a change from the preliminary interpretation which was discussed   by Dr. Ramirez with Dr. Go on 1/9/2024 8:45 AM with read back.    --- End of Report ---      A/P: 73yFemale with L2-L3 and L4-L5 lumbar stenosis  MRI lumbar spine showing severe lumbar stenosis at L2-L3 with mass effect on descending nerve roots  Weight bearing status- WBAT   Mobilize with physical therapy  Pain control  Patient history, exam, imaging and plan discussed with Dr. Jiang, plan pending discussion between Dr. Jiang and patient tonight for surgical intervention vs outpatient injection      Ortho Pager [phone]

## 2024-01-09 NOTE — PROGRESS NOTE ADULT - SUBJECTIVE AND OBJECTIVE BOX
OVERNIGHT EVENTS:    SUBJECTIVE / INTERVAL HPI: Patient seen and examined at bedside.     VITAL SIGNS:  Vital Signs Last 24 Hrs  T(C): 36.6 (09 Jan 2024 04:35), Max: 37.1 (08 Jan 2024 14:00)  T(F): 97.9 (09 Jan 2024 04:35), Max: 98.8 (08 Jan 2024 14:00)  HR: 88 (09 Jan 2024 05:50) (82 - 90)  BP: 138/78 (09 Jan 2024 05:50) (123/66 - 159/74)  BP(mean): --  RR: 17 (09 Jan 2024 04:35) (16 - 18)  SpO2: 96% (09 Jan 2024 04:35) (95% - 98%)    Parameters below as of 09 Jan 2024 04:35  Patient On (Oxygen Delivery Method): room air      I&O's Summary    07 Jan 2024 07:01  -  08 Jan 2024 07:00  --------------------------------------------------------  IN: 0 mL / OUT: 400 mL / NET: -400 mL    08 Jan 2024 07:01  -  09 Jan 2024 06:31  --------------------------------------------------------  IN: 0 mL / OUT: 3450 mL / NET: -3450 mL        PHYSICAL EXAM:    General: WDWN  HEENT: NC/AT; PERRL, anicteric sclera; MMM  Neck: supple  Cardiovascular: +S1/S2; RRR  Respiratory: CTA B/L; no W/R/R  Gastrointestinal: soft, NT/ND; +BSx4  Extremities: WWP; no edema, clubbing or cyanosis  Vascular: 2+ radial, DP/PT pulses B/L  Neurological: AAOx3; no focal deficits    MEDICATIONS:  MEDICATIONS  (STANDING):  amLODIPine   Tablet 10 milliGRAM(s) Oral daily  budesonide  80 MICROgram(s)/formoterol 4.5 MICROgram(s) Inhaler 2 Puff(s) Inhalation two times a day  enoxaparin Injectable 40 milliGRAM(s) SubCutaneous every 24 hours  gabapentin 100 milliGRAM(s) Oral every 12 hours  pantoprazole    Tablet 40 milliGRAM(s) Oral before breakfast    MEDICATIONS  (PRN):  acetaminophen     Tablet .. 650 milliGRAM(s) Oral every 6 hours PRN Temp greater or equal to 38C (100.4F), Mild Pain (1 - 3)  aluminum hydroxide/magnesium hydroxide/simethicone Suspension 30 milliLiter(s) Oral every 4 hours PRN Dyspepsia  baclofen 5 milliGRAM(s) Oral every 12 hours PRN Muscle Spasm  melatonin 3 milliGRAM(s) Oral at bedtime PRN Insomnia  ondansetron Injectable 4 milliGRAM(s) IV Push every 8 hours PRN Nausea and/or Vomiting      ALLERGIES:  Allergies    No Known Allergies    Intolerances        LABS:                        13.4   12.05 )-----------( 227      ( 09 Jan 2024 05:30 )             40.4     01-08    137  |  100  |  12  ----------------------------<  93  3.7   |  28  |  0.87    Ca    9.4      08 Jan 2024 05:30  Phos  3.4     01-08  Mg     2.2     01-08        Urinalysis Basic - ( 08 Jan 2024 05:30 )    Color: x / Appearance: x / SG: x / pH: x  Gluc: 93 mg/dL / Ketone: x  / Bili: x / Urobili: x   Blood: x / Protein: x / Nitrite: x   Leuk Esterase: x / RBC: x / WBC x   Sq Epi: x / Non Sq Epi: x / Bacteria: x      CAPILLARY BLOOD GLUCOSE          RADIOLOGY & ADDITIONAL TESTS: Reviewed.   OVERNIGHT EVENTS: NEAL    SUBJECTIVE / INTERVAL HPI: Patient seen and examined at bedside. Patient denies any new complaints, admits to significant improvement to her previous L LE inguinal and lateral thigh pain s/p ketorolac this morning. Denies urinary/fecal incontinence, loss of strength, fever/chills, n/v, dysuria. No bowel movement since admission. Patient is able to have left leg lifted off the bed without any pain today.    VITAL SIGNS:  Vital Signs Last 24 Hrs  T(C): 36.6 (09 Jan 2024 04:35), Max: 37.1 (08 Jan 2024 14:00)  T(F): 97.9 (09 Jan 2024 04:35), Max: 98.8 (08 Jan 2024 14:00)  HR: 88 (09 Jan 2024 05:50) (82 - 90)  BP: 138/78 (09 Jan 2024 05:50) (123/66 - 159/74)  BP(mean): --  RR: 17 (09 Jan 2024 04:35) (16 - 18)  SpO2: 96% (09 Jan 2024 04:35) (95% - 98%)    Parameters below as of 09 Jan 2024 04:35  Patient On (Oxygen Delivery Method): room air      I&O's Summary    07 Jan 2024 07:01  -  08 Jan 2024 07:00  --------------------------------------------------------  IN: 0 mL / OUT: 400 mL / NET: -400 mL    08 Jan 2024 07:01  -  09 Jan 2024 06:31  --------------------------------------------------------  IN: 0 mL / OUT: 3450 mL / NET: -3450 mL        PHYSICAL EXAM:    General: WDWN  HEENT: NC/AT; PERRL, anicteric sclera; MMM  Neck: supple  Cardiovascular: +S1/S2; RRR  Respiratory: CTA B/L; no W/R/R  Gastrointestinal: soft, NT/ND; +BSx4  Extremities: WWP; no edema, clubbing or cyanosis.   Vascular: 2+ radial, DP/PT pulses B/L  Neurological: AAOx3; no focal deficits. Negative straight leg test. 5/5 strength in b/l UE and LE.    MEDICATIONS:  MEDICATIONS  (STANDING):  amLODIPine   Tablet 10 milliGRAM(s) Oral daily  budesonide  80 MICROgram(s)/formoterol 4.5 MICROgram(s) Inhaler 2 Puff(s) Inhalation two times a day  enoxaparin Injectable 40 milliGRAM(s) SubCutaneous every 24 hours  gabapentin 100 milliGRAM(s) Oral every 12 hours  pantoprazole    Tablet 40 milliGRAM(s) Oral before breakfast    MEDICATIONS  (PRN):  acetaminophen     Tablet .. 650 milliGRAM(s) Oral every 6 hours PRN Temp greater or equal to 38C (100.4F), Mild Pain (1 - 3)  aluminum hydroxide/magnesium hydroxide/simethicone Suspension 30 milliLiter(s) Oral every 4 hours PRN Dyspepsia  baclofen 5 milliGRAM(s) Oral every 12 hours PRN Muscle Spasm  melatonin 3 milliGRAM(s) Oral at bedtime PRN Insomnia  ondansetron Injectable 4 milliGRAM(s) IV Push every 8 hours PRN Nausea and/or Vomiting      ALLERGIES:  Allergies    No Known Allergies    Intolerances        LABS:                        13.4   12.05 )-----------( 227      ( 09 Jan 2024 05:30 )             40.4     01-08    137  |  100  |  12  ----------------------------<  93  3.7   |  28  |  0.87    Ca    9.4      08 Jan 2024 05:30  Phos  3.4     01-08  Mg     2.2     01-08        Urinalysis Basic - ( 08 Jan 2024 05:30 )    Color: x / Appearance: x / SG: x / pH: x  Gluc: 93 mg/dL / Ketone: x  / Bili: x / Urobili: x   Blood: x / Protein: x / Nitrite: x   Leuk Esterase: x / RBC: x / WBC x   Sq Epi: x / Non Sq Epi: x / Bacteria: x      CAPILLARY BLOOD GLUCOSE          RADIOLOGY & ADDITIONAL TESTS: Reviewed.

## 2024-01-09 NOTE — PROGRESS NOTE ADULT - PROBLEM SELECTOR PLAN 1
P/w left buttock pain and muscle spasms radiating to left leg. She has had similar episodes in the past.   *CT L-spine shows:  No evidence of acute osseous fracture or jacy dislocation. Multilevel degenerative change of lumbar spine, most significantly at the L2-L3 level where there is moderate to severe narrowing of the spinal canal. In the setting of neuropathy, recommend MRI of the lumbar spine  for further evaluation. No s/s of saddle anesthesia, bowel/bladder incontinence. Walks independently.  *S/p tramadol, morphine 2mg x1, methocarbamol 750mg x1, ketorolac 15mg x1, and valium 5mg x1.  *Neuro exam: MSK 5/5, sensation intact  *MR L-spine (1/9/24) revealed L2-L3 moderate to severe spinal canal stenosis and left lateral recess narrowing with mass effect on the left greater than the right descending nerve roots; L4-L5 moderate to severe left and moderate right neural foraminal narrowing with mild to moderate spinal canal stenosis.    Plan:  - Consult orthopedics - would appreciate recs  - Pain control; Tylenol for mild, lidocaine patch to affected area  - C/w gabapentin 100mg BID  - F/u outpatient with orthopedics (Dr Chuck Shah January 17th at 10:45am)

## 2024-01-09 NOTE — PROGRESS NOTE ADULT - PROBLEM SELECTOR PLAN 3
History of L breast cancer (2005) in remission. F/w Dr. Donaldson (sp?). Had a recent follow up with oncologist in October.    Plan:  - CTM.

## 2024-01-09 NOTE — PROGRESS NOTE ADULT - PROBLEM SELECTOR PLAN 4
F: NI  E: Replete as needed  N: Regular diet  DVT PPx: Lovenox  GI: Pantoprazole 40mg qd  Code status: FULL code.

## 2024-01-09 NOTE — PROGRESS NOTE ADULT - ASSESSMENT
72 y/o F with history of HTN, sciatica, breast CA (remission) presents c/o left buttock and L groin pain and muscle spasms radiating to left leg today, admitted for further management and PT eval, found to have severe multilevel spinal stenosis w/ mass effect on cauda equina, pending orthopedic surgery eval and MARY placement. 74 y/o F with history of HTN, sciatica, breast CA (remission) presents c/o left buttock and L groin pain and muscle spasms radiating to left leg today, admitted for further management and PT eval, found to have severe multilevel spinal stenosis w/ mass effect on cauda equina, pending orthopedic surgery eval and MARY placement.

## 2024-01-10 ENCOUNTER — TRANSCRIPTION ENCOUNTER (OUTPATIENT)
Age: 74
End: 2024-01-10

## 2024-01-10 VITALS
TEMPERATURE: 99 F | OXYGEN SATURATION: 95 % | HEART RATE: 87 BPM | SYSTOLIC BLOOD PRESSURE: 127 MMHG | RESPIRATION RATE: 17 BRPM | DIASTOLIC BLOOD PRESSURE: 74 MMHG

## 2024-01-10 LAB
ANION GAP SERPL CALC-SCNC: 8 MMOL/L — SIGNIFICANT CHANGE UP (ref 5–17)
ANION GAP SERPL CALC-SCNC: 8 MMOL/L — SIGNIFICANT CHANGE UP (ref 5–17)
BASOPHILS # BLD AUTO: 0.03 K/UL — SIGNIFICANT CHANGE UP (ref 0–0.2)
BASOPHILS # BLD AUTO: 0.03 K/UL — SIGNIFICANT CHANGE UP (ref 0–0.2)
BASOPHILS NFR BLD AUTO: 0.3 % — SIGNIFICANT CHANGE UP (ref 0–2)
BASOPHILS NFR BLD AUTO: 0.3 % — SIGNIFICANT CHANGE UP (ref 0–2)
BUN SERPL-MCNC: 15 MG/DL — SIGNIFICANT CHANGE UP (ref 7–23)
BUN SERPL-MCNC: 15 MG/DL — SIGNIFICANT CHANGE UP (ref 7–23)
CALCIUM SERPL-MCNC: 9.9 MG/DL — SIGNIFICANT CHANGE UP (ref 8.4–10.5)
CALCIUM SERPL-MCNC: 9.9 MG/DL — SIGNIFICANT CHANGE UP (ref 8.4–10.5)
CHLORIDE SERPL-SCNC: 99 MMOL/L — SIGNIFICANT CHANGE UP (ref 96–108)
CHLORIDE SERPL-SCNC: 99 MMOL/L — SIGNIFICANT CHANGE UP (ref 96–108)
CO2 SERPL-SCNC: 28 MMOL/L — SIGNIFICANT CHANGE UP (ref 22–31)
CO2 SERPL-SCNC: 28 MMOL/L — SIGNIFICANT CHANGE UP (ref 22–31)
CREAT SERPL-MCNC: 0.85 MG/DL — SIGNIFICANT CHANGE UP (ref 0.5–1.3)
CREAT SERPL-MCNC: 0.85 MG/DL — SIGNIFICANT CHANGE UP (ref 0.5–1.3)
EGFR: 73 ML/MIN/1.73M2 — SIGNIFICANT CHANGE UP
EGFR: 73 ML/MIN/1.73M2 — SIGNIFICANT CHANGE UP
EOSINOPHIL # BLD AUTO: 0.27 K/UL — SIGNIFICANT CHANGE UP (ref 0–0.5)
EOSINOPHIL # BLD AUTO: 0.27 K/UL — SIGNIFICANT CHANGE UP (ref 0–0.5)
EOSINOPHIL NFR BLD AUTO: 2.9 % — SIGNIFICANT CHANGE UP (ref 0–6)
EOSINOPHIL NFR BLD AUTO: 2.9 % — SIGNIFICANT CHANGE UP (ref 0–6)
GLUCOSE SERPL-MCNC: 97 MG/DL — SIGNIFICANT CHANGE UP (ref 70–99)
GLUCOSE SERPL-MCNC: 97 MG/DL — SIGNIFICANT CHANGE UP (ref 70–99)
HCT VFR BLD CALC: 40.7 % — SIGNIFICANT CHANGE UP (ref 34.5–45)
HCT VFR BLD CALC: 40.7 % — SIGNIFICANT CHANGE UP (ref 34.5–45)
HGB BLD-MCNC: 13.3 G/DL — SIGNIFICANT CHANGE UP (ref 11.5–15.5)
HGB BLD-MCNC: 13.3 G/DL — SIGNIFICANT CHANGE UP (ref 11.5–15.5)
IMM GRANULOCYTES NFR BLD AUTO: 0.2 % — SIGNIFICANT CHANGE UP (ref 0–0.9)
IMM GRANULOCYTES NFR BLD AUTO: 0.2 % — SIGNIFICANT CHANGE UP (ref 0–0.9)
LYMPHOCYTES # BLD AUTO: 2.33 K/UL — SIGNIFICANT CHANGE UP (ref 1–3.3)
LYMPHOCYTES # BLD AUTO: 2.33 K/UL — SIGNIFICANT CHANGE UP (ref 1–3.3)
LYMPHOCYTES # BLD AUTO: 24.9 % — SIGNIFICANT CHANGE UP (ref 13–44)
LYMPHOCYTES # BLD AUTO: 24.9 % — SIGNIFICANT CHANGE UP (ref 13–44)
MAGNESIUM SERPL-MCNC: 2.1 MG/DL — SIGNIFICANT CHANGE UP (ref 1.6–2.6)
MAGNESIUM SERPL-MCNC: 2.1 MG/DL — SIGNIFICANT CHANGE UP (ref 1.6–2.6)
MCHC RBC-ENTMCNC: 27.4 PG — SIGNIFICANT CHANGE UP (ref 27–34)
MCHC RBC-ENTMCNC: 27.4 PG — SIGNIFICANT CHANGE UP (ref 27–34)
MCHC RBC-ENTMCNC: 32.7 GM/DL — SIGNIFICANT CHANGE UP (ref 32–36)
MCHC RBC-ENTMCNC: 32.7 GM/DL — SIGNIFICANT CHANGE UP (ref 32–36)
MCV RBC AUTO: 83.7 FL — SIGNIFICANT CHANGE UP (ref 80–100)
MCV RBC AUTO: 83.7 FL — SIGNIFICANT CHANGE UP (ref 80–100)
MONOCYTES # BLD AUTO: 0.66 K/UL — SIGNIFICANT CHANGE UP (ref 0–0.9)
MONOCYTES # BLD AUTO: 0.66 K/UL — SIGNIFICANT CHANGE UP (ref 0–0.9)
MONOCYTES NFR BLD AUTO: 7.1 % — SIGNIFICANT CHANGE UP (ref 2–14)
MONOCYTES NFR BLD AUTO: 7.1 % — SIGNIFICANT CHANGE UP (ref 2–14)
NEUTROPHILS # BLD AUTO: 6.05 K/UL — SIGNIFICANT CHANGE UP (ref 1.8–7.4)
NEUTROPHILS # BLD AUTO: 6.05 K/UL — SIGNIFICANT CHANGE UP (ref 1.8–7.4)
NEUTROPHILS NFR BLD AUTO: 64.6 % — SIGNIFICANT CHANGE UP (ref 43–77)
NEUTROPHILS NFR BLD AUTO: 64.6 % — SIGNIFICANT CHANGE UP (ref 43–77)
NRBC # BLD: 0 /100 WBCS — SIGNIFICANT CHANGE UP (ref 0–0)
NRBC # BLD: 0 /100 WBCS — SIGNIFICANT CHANGE UP (ref 0–0)
PHOSPHATE SERPL-MCNC: 3.7 MG/DL — SIGNIFICANT CHANGE UP (ref 2.5–4.5)
PHOSPHATE SERPL-MCNC: 3.7 MG/DL — SIGNIFICANT CHANGE UP (ref 2.5–4.5)
PLATELET # BLD AUTO: 236 K/UL — SIGNIFICANT CHANGE UP (ref 150–400)
PLATELET # BLD AUTO: 236 K/UL — SIGNIFICANT CHANGE UP (ref 150–400)
POTASSIUM SERPL-MCNC: 4.2 MMOL/L — SIGNIFICANT CHANGE UP (ref 3.5–5.3)
POTASSIUM SERPL-MCNC: 4.2 MMOL/L — SIGNIFICANT CHANGE UP (ref 3.5–5.3)
POTASSIUM SERPL-SCNC: 4.2 MMOL/L — SIGNIFICANT CHANGE UP (ref 3.5–5.3)
POTASSIUM SERPL-SCNC: 4.2 MMOL/L — SIGNIFICANT CHANGE UP (ref 3.5–5.3)
RBC # BLD: 4.86 M/UL — SIGNIFICANT CHANGE UP (ref 3.8–5.2)
RBC # BLD: 4.86 M/UL — SIGNIFICANT CHANGE UP (ref 3.8–5.2)
RBC # FLD: 14.8 % — HIGH (ref 10.3–14.5)
RBC # FLD: 14.8 % — HIGH (ref 10.3–14.5)
SODIUM SERPL-SCNC: 135 MMOL/L — SIGNIFICANT CHANGE UP (ref 135–145)
SODIUM SERPL-SCNC: 135 MMOL/L — SIGNIFICANT CHANGE UP (ref 135–145)
WBC # BLD: 9.36 K/UL — SIGNIFICANT CHANGE UP (ref 3.8–10.5)
WBC # BLD: 9.36 K/UL — SIGNIFICANT CHANGE UP (ref 3.8–10.5)
WBC # FLD AUTO: 9.36 K/UL — SIGNIFICANT CHANGE UP (ref 3.8–10.5)
WBC # FLD AUTO: 9.36 K/UL — SIGNIFICANT CHANGE UP (ref 3.8–10.5)

## 2024-01-10 PROCEDURE — 83735 ASSAY OF MAGNESIUM: CPT

## 2024-01-10 PROCEDURE — 96375 TX/PRO/DX INJ NEW DRUG ADDON: CPT

## 2024-01-10 PROCEDURE — 97116 GAIT TRAINING THERAPY: CPT

## 2024-01-10 PROCEDURE — 97161 PT EVAL LOW COMPLEX 20 MIN: CPT

## 2024-01-10 PROCEDURE — 99239 HOSP IP/OBS DSCHRG MGMT >30: CPT | Mod: GC

## 2024-01-10 PROCEDURE — 86803 HEPATITIS C AB TEST: CPT

## 2024-01-10 PROCEDURE — 72148 MRI LUMBAR SPINE W/O DYE: CPT

## 2024-01-10 PROCEDURE — 99285 EMERGENCY DEPT VISIT HI MDM: CPT | Mod: 25

## 2024-01-10 PROCEDURE — 84100 ASSAY OF PHOSPHORUS: CPT

## 2024-01-10 PROCEDURE — 81001 URINALYSIS AUTO W/SCOPE: CPT

## 2024-01-10 PROCEDURE — G1004: CPT

## 2024-01-10 PROCEDURE — 80048 BASIC METABOLIC PNL TOTAL CA: CPT

## 2024-01-10 PROCEDURE — 96374 THER/PROPH/DIAG INJ IV PUSH: CPT

## 2024-01-10 PROCEDURE — 36415 COLL VENOUS BLD VENIPUNCTURE: CPT

## 2024-01-10 PROCEDURE — 85025 COMPLETE CBC W/AUTO DIFF WBC: CPT

## 2024-01-10 PROCEDURE — 72131 CT LUMBAR SPINE W/O DYE: CPT | Mod: MG

## 2024-01-10 PROCEDURE — 97530 THERAPEUTIC ACTIVITIES: CPT

## 2024-01-10 RX ORDER — BACLOFEN 100 %
0.5 POWDER (GRAM) MISCELLANEOUS
Refills: 0 | DISCHARGE

## 2024-01-10 RX ORDER — CYCLOBENZAPRINE HYDROCHLORIDE 10 MG/1
1 TABLET, FILM COATED ORAL
Qty: 42 | Refills: 0
Start: 2024-01-10 | End: 2024-01-23

## 2024-01-10 RX ORDER — CYCLOBENZAPRINE HYDROCHLORIDE 10 MG/1
5 TABLET, FILM COATED ORAL THREE TIMES A DAY
Refills: 0 | Status: DISCONTINUED | OUTPATIENT
Start: 2024-01-10 | End: 2024-01-10

## 2024-01-10 RX ADMIN — PANTOPRAZOLE SODIUM 40 MILLIGRAM(S): 20 TABLET, DELAYED RELEASE ORAL at 06:17

## 2024-01-10 RX ADMIN — Medication 650 MILLIGRAM(S): at 06:18

## 2024-01-10 RX ADMIN — ENOXAPARIN SODIUM 40 MILLIGRAM(S): 100 INJECTION SUBCUTANEOUS at 05:42

## 2024-01-10 RX ADMIN — Medication 650 MILLIGRAM(S): at 07:18

## 2024-01-10 RX ADMIN — GABAPENTIN 100 MILLIGRAM(S): 400 CAPSULE ORAL at 07:40

## 2024-01-10 RX ADMIN — AMLODIPINE BESYLATE 10 MILLIGRAM(S): 2.5 TABLET ORAL at 05:43

## 2024-01-10 NOTE — DISCHARGE NOTE NURSING/CASE MANAGEMENT/SOCIAL WORK - NSDCFUADDAPPT_GEN_ALL_CORE_FT
Dr. Jiang on January 16th, 2:15pm at [address] [place] [address] [address], [place] [address] Dr. Jiang on January 16th, 2:15pm at [address] [place] [address] [address], [place] [address]

## 2024-01-10 NOTE — DISCHARGE NOTE NURSING/CASE MANAGEMENT/SOCIAL WORK - PATIENT PORTAL LINK FT
You can access the FollowMyHealth Patient Portal offered by [facility] by registering at the following website: [email or web address]. By joining [employer]’s FollowMyHealth portal, you will also be able to view your health information using other applications (apps) compatible with our system. You can access the FollowMyHealth Patient Portal offered by [facility] by registering at the following website: [email or web address]. By joining [employer]’s FollowMyHealth portal, you will also be able to view your health information using other applications (apps) compatible with our system.

## 2024-01-10 NOTE — PROGRESS NOTE ADULT - SUBJECTIVE AND OBJECTIVE BOX
**INCOMPLETE NOTE    OVERNIGHT EVENTS:    SUBJECTIVE:  Patient seen and examined at bedside.  No fever, chills, dizziness, headache, changes in vision, chest pain, dyspnea, nausea or vomiting, dysuria, changes in bowel movements, LE edema. Rest of 12 point Review of systems negative unless otherwise documented elsewhere in note.     Vital Signs Last 12 Hrs  T(F): 98.4 (01-10-24 @ 04:45), Max: 98.4 (01-10-24 @ 04:45)  HR: 78 (01-10-24 @ 05:40) (76 - 84)  BP: 131/70 (01-10-24 @ 05:40) (131/70 - 144/84)  BP(mean): --  RR: 17 (01-10-24 @ 05:40) (17 - 18)  SpO2: 96% (01-10-24 @ 05:40) (95% - 96%)  I&O's Summary    09 Jan 2024 07:01  -  10 Jakub 2024 07:00  --------------------------------------------------------  IN: 240 mL / OUT: 1400 mL / NET: -1160 mL        PHYSICAL EXAM:  Constitutional: NAD, comfortable in bed.  HEENT: NC/AT, PERRLA, EOMI, no conjunctival pallor or scleral icterus, MMM  Neck: Supple, no JVD  Respiratory: CTA B/L. No w/r/r.   Cardiovascular: RRR, normal S1 and S2, no m/r/g.   Gastrointestinal: +BS, soft NTND, no guarding or rebound tenderness, no palpable masses   Extremities: wwp; no cyanosis, clubbing or edema.   Vascular: Pulses equal and strong throughout.   Neurological: AAOx3, no CN deficits, strength and sensation intact throughout.   Skin: No gross skin abnormalities or rashes        LABS:                        13.3   9.36  )-----------( 236      ( 10 Jakub 2024 05:30 )             40.7     01-10    135  |  99  |  15  ----------------------------<  97  4.2   |  28  |  0.85    Ca    9.9      10 Jakub 2024 05:30  Phos  3.7     01-10  Mg     2.1     01-10        Urinalysis Basic - ( 10 Jakub 2024 05:30 )    Color: x / Appearance: x / SG: x / pH: x  Gluc: 97 mg/dL / Ketone: x  / Bili: x / Urobili: x   Blood: x / Protein: x / Nitrite: x   Leuk Esterase: x / RBC: x / WBC x   Sq Epi: x / Non Sq Epi: x / Bacteria: x          RADIOLOGY & ADDITIONAL TESTS:    MEDICATIONS  (STANDING):  amLODIPine   Tablet 10 milliGRAM(s) Oral daily  budesonide  80 MICROgram(s)/formoterol 4.5 MICROgram(s) Inhaler 2 Puff(s) Inhalation two times a day  enoxaparin Injectable 40 milliGRAM(s) SubCutaneous every 24 hours  gabapentin 100 milliGRAM(s) Oral every 12 hours  pantoprazole    Tablet 40 milliGRAM(s) Oral before breakfast    MEDICATIONS  (PRN):  acetaminophen     Tablet .. 650 milliGRAM(s) Oral every 6 hours PRN Temp greater or equal to 38C (100.4F), Mild Pain (1 - 3)  aluminum hydroxide/magnesium hydroxide/simethicone Suspension 30 milliLiter(s) Oral every 4 hours PRN Dyspepsia  baclofen 5 milliGRAM(s) Oral every 12 hours PRN Muscle Spasm  melatonin 3 milliGRAM(s) Oral at bedtime PRN Insomnia  ondansetron Injectable 4 milliGRAM(s) IV Push every 8 hours PRN Nausea and/or Vomiting   **INCOMPLETE NOTE    OVERNIGHT EVENTS: NAEO    SUBJECTIVE:  Patient seen and examined at bedside. Patient endorses continued L leg pain in inguinal, lateral thigh, and anterior thigh areas that is worse compared to yesterday. Patient denies any recent bowel movement since admission and associates it with reduced oral intake. Otherwise denies any new complaints such as fever, chills, abdominal pain, n/v.     Of note, patient endorsed wanting to stay inpatient for further treatment of her back pain, including any procedures or surgeries as she would like "to fix the root issue" of her pain. As per ortho previous note, was planned for outpatient followup, likely Dr. Jiang . Patient has an existing appt with Dr. Shah on the 17th.    Patient will further discuss MARY locations with son, who will visit again this afternoon.    Vital Signs Last 12 Hrs  T(F): 98.4 (01-10-24 @ 04:45), Max: 98.4 (01-10-24 @ 04:45)  HR: 78 (01-10-24 @ 05:40) (76 - 84)  BP: 131/70 (01-10-24 @ 05:40) (131/70 - 144/84)  BP(mean): --  RR: 17 (01-10-24 @ 05:40) (17 - 18)  SpO2: 96% (01-10-24 @ 05:40) (95% - 96%)  I&O's Summary    09 Jan 2024 07:01  -  10 Jakub 2024 07:00  --------------------------------------------------------  IN: 240 mL / OUT: 1400 mL / NET: -1160 mL    PHYSICAL EXAM:  Constitutional: NAD, comfortable in bed.  HEENT: NC/AT, PERRLA, EOMI, no conjunctival pallor or scleral icterus, MMM  Neck: Supple, no JVD  Respiratory: CTA B/L. No w/r/r.   Cardiovascular: RRR, normal S1 and S2, no m/r/g.   Gastrointestinal: +BS, soft NTND, no guarding or rebound tenderness, no palpable masses   Extremities: wwp; no cyanosis, clubbing or edema.   Vascular: Pulses equal and strong throughout.   Neurological: AAOx3, no CN deficits, strength and sensation intact throughout.   Skin: No gross skin abnormalities or rashes    LABS:                        13.3   9.36  )-----------( 236      ( 10 Jakub 2024 05:30 )             40.7     01-10    135  |  99  |  15  ----------------------------<  97  4.2   |  28  |  0.85    Ca    9.9      10 Jakub 2024 05:30  Phos  3.7     01-10  Mg     2.1     01-10        Urinalysis Basic - ( 10 Jakub 2024 05:30 )    Color: x / Appearance: x / SG: x / pH: x  Gluc: 97 mg/dL / Ketone: x  / Bili: x / Urobili: x   Blood: x / Protein: x / Nitrite: x   Leuk Esterase: x / RBC: x / WBC x   Sq Epi: x / Non Sq Epi: x / Bacteria: x    RADIOLOGY & ADDITIONAL TESTS:    MEDICATIONS  (STANDING):  amLODIPine   Tablet 10 milliGRAM(s) Oral daily  budesonide  80 MICROgram(s)/formoterol 4.5 MICROgram(s) Inhaler 2 Puff(s) Inhalation two times a day  enoxaparin Injectable 40 milliGRAM(s) SubCutaneous every 24 hours  gabapentin 100 milliGRAM(s) Oral every 12 hours  pantoprazole    Tablet 40 milliGRAM(s) Oral before breakfast    MEDICATIONS  (PRN):  acetaminophen     Tablet .. 650 milliGRAM(s) Oral every 6 hours PRN Temp greater or equal to 38C (100.4F), Mild Pain (1 - 3)  aluminum hydroxide/magnesium hydroxide/simethicone Suspension 30 milliLiter(s) Oral every 4 hours PRN Dyspepsia  baclofen 5 milliGRAM(s) Oral every 12 hours PRN Muscle Spasm  melatonin 3 milliGRAM(s) Oral at bedtime PRN Insomnia  ondansetron Injectable 4 milliGRAM(s) IV Push every 8 hours PRN Nausea and/or Vomiting   **INCOMPLETE NOTE    OVERNIGHT EVENTS: NAEO    SUBJECTIVE:  Patient seen and examined at bedside. Patient endorses continued L leg pain in inguinal, lateral thigh, and anterior thigh areas that is worse compared to yesterday. Patient denies any recent bowel movement since admission and associates it with reduced oral intake. Otherwise denies any new complaints such as fever, chills, abdominal pain, n/v.     As per ortho previous note, was planned for outpatient followup with Dr. Jiang on January 16th, 2:15pm at [address] [place] [address] [address] [address].    Patient will further discuss MARY locations with son, who will visit again this afternoon.    Vital Signs Last 12 Hrs  T(F): 98.4 (01-10-24 @ 04:45), Max: 98.4 (01-10-24 @ 04:45)  HR: 78 (01-10-24 @ 05:40) (76 - 84)  BP: 131/70 (01-10-24 @ 05:40) (131/70 - 144/84)  BP(mean): --  RR: 17 (01-10-24 @ 05:40) (17 - 18)  SpO2: 96% (01-10-24 @ 05:40) (95% - 96%)  I&O's Summary    09 Jan 2024 07:01  -  10 Jakub 2024 07:00  --------------------------------------------------------  IN: 240 mL / OUT: 1400 mL / NET: -1160 mL    PHYSICAL EXAM:  Constitutional: NAD, comfortable in bed.  HEENT: NC/AT, PERRLA, EOMI, no conjunctival pallor or scleral icterus, MMM  Neck: Supple, no JVD  Respiratory: CTA B/L. No w/r/r.   Cardiovascular: RRR, normal S1 and S2, no m/r/g.   Gastrointestinal: +BS, soft NTND, no guarding or rebound tenderness, no palpable masses   Extremities: wwp; no cyanosis, clubbing or edema.   Vascular: Pulses equal and strong throughout.   Neurological: AAOx3, no CN deficits, strength and sensation intact throughout.   Skin: No gross skin abnormalities or rashes    LABS:                        13.3   9.36  )-----------( 236      ( 10 Jakub 2024 05:30 )             40.7     01-10    135  |  99  |  15  ----------------------------<  97  4.2   |  28  |  0.85    Ca    9.9      10 Jakub 2024 05:30  Phos  3.7     01-10  Mg     2.1     01-10        Urinalysis Basic - ( 10 Jakub 2024 05:30 )    Color: x / Appearance: x / SG: x / pH: x  Gluc: 97 mg/dL / Ketone: x  / Bili: x / Urobili: x   Blood: x / Protein: x / Nitrite: x   Leuk Esterase: x / RBC: x / WBC x   Sq Epi: x / Non Sq Epi: x / Bacteria: x    RADIOLOGY & ADDITIONAL TESTS:    MEDICATIONS  (STANDING):  amLODIPine   Tablet 10 milliGRAM(s) Oral daily  budesonide  80 MICROgram(s)/formoterol 4.5 MICROgram(s) Inhaler 2 Puff(s) Inhalation two times a day  enoxaparin Injectable 40 milliGRAM(s) SubCutaneous every 24 hours  gabapentin 100 milliGRAM(s) Oral every 12 hours  pantoprazole    Tablet 40 milliGRAM(s) Oral before breakfast    MEDICATIONS  (PRN):  acetaminophen     Tablet .. 650 milliGRAM(s) Oral every 6 hours PRN Temp greater or equal to 38C (100.4F), Mild Pain (1 - 3)  aluminum hydroxide/magnesium hydroxide/simethicone Suspension 30 milliLiter(s) Oral every 4 hours PRN Dyspepsia  baclofen 5 milliGRAM(s) Oral every 12 hours PRN Muscle Spasm  melatonin 3 milliGRAM(s) Oral at bedtime PRN Insomnia  ondansetron Injectable 4 milliGRAM(s) IV Push every 8 hours PRN Nausea and/or Vomiting   **INCOMPLETE NOTE    OVERNIGHT EVENTS: NAEO    SUBJECTIVE:  Patient seen and examined at bedside. Patient endorses continued L leg pain in inguinal, lateral thigh, and anterior thigh areas that is worse compared to yesterday. Patient denies any recent bowel movement since admission and associates it with reduced oral intake. Otherwise denies any new complaints such as fever, chills, abdominal pain, n/v.     As per ortho previous note, was planned for outpatient followup with Dr. Jiang on January 16th, 2:15pm at [address] [place] [address] [address] [address].    Patient will further discuss MARY locations with son, who will visit again this afternoon.    Vital Signs Last 12 Hrs  T(F): 98.4 (01-10-24 @ 04:45), Max: 98.4 (01-10-24 @ 04:45)  HR: 78 (01-10-24 @ 05:40) (76 - 84)  BP: 131/70 (01-10-24 @ 05:40) (131/70 - 144/84)  BP(mean): --  RR: 17 (01-10-24 @ 05:40) (17 - 18)  SpO2: 96% (01-10-24 @ 05:40) (95% - 96%)  I&O's Summary    09 Jan 2024 07:01  -  10 Jakub 2024 07:00  --------------------------------------------------------  IN: 240 mL / OUT: 1400 mL / NET: -1160 mL    PHYSICAL EXAM:  Constitutional: NAD, comfortable in bed.  HEENT: NC/AT, PERRLA, EOMI, no conjunctival pallor or scleral icterus, MMM  Neck: Supple, no JVD  Respiratory: CTA B/L. No w/r/r.   Cardiovascular: RRR, normal S1 and S2, no m/r/g.   Gastrointestinal: +BS, soft NTND, no guarding or rebound tenderness, no palpable masses   Extremities: wwp; no cyanosis, clubbing or edema.   Vascular: Pulses equal and strong throughout.   Neurological: AAOx3, no CN deficits, strength and sensation intact throughout.   Skin: No gross skin abnormalities or rashes    LABS:                        13.3   9.36  )-----------( 236      ( 10 Jakub 2024 05:30 )             40.7     01-10    135  |  99  |  15  ----------------------------<  97  4.2   |  28  |  0.85    Ca    9.9      10 Jakub 2024 05:30  Phos  3.7     01-10  Mg     2.1     01-10        Urinalysis Basic - ( 10 Jakub 2024 05:30 )    Color: x / Appearance: x / SG: x / pH: x  Gluc: 97 mg/dL / Ketone: x  / Bili: x / Urobili: x   Blood: x / Protein: x / Nitrite: x   Leuk Esterase: x / RBC: x / WBC x   Sq Epi: x / Non Sq Epi: x / Bacteria: x    RADIOLOGY & ADDITIONAL TESTS:    MEDICATIONS  (STANDING):  amLODIPine   Tablet 10 milliGRAM(s) Oral daily  budesonide  80 MICROgram(s)/formoterol 4.5 MICROgram(s) Inhaler 2 Puff(s) Inhalation two times a day  enoxaparin Injectable 40 milliGRAM(s) SubCutaneous every 24 hours  gabapentin 100 milliGRAM(s) Oral every 12 hours  pantoprazole    Tablet 40 milliGRAM(s) Oral before breakfast    MEDICATIONS  (PRN):  acetaminophen     Tablet .. 650 milliGRAM(s) Oral every 6 hours PRN Temp greater or equal to 38C (100.4F), Mild Pain (1 - 3)  aluminum hydroxide/magnesium hydroxide/simethicone Suspension 30 milliLiter(s) Oral every 4 hours PRN Dyspepsia  baclofen 5 milliGRAM(s) Oral every 12 hours PRN Muscle Spasm  melatonin 3 milliGRAM(s) Oral at bedtime PRN Insomnia  ondansetron Injectable 4 milliGRAM(s) IV Push every 8 hours PRN Nausea and/or Vomiting

## 2024-01-10 NOTE — PROGRESS NOTE ADULT - ATTENDING COMMENTS
pending MARY and  MR L-spine (1/9/24) revealed L2-L3 moderate to severe spinal canal stenosis and left lateral recess narrowing with mass effect on the left greater than the right descending nerve roots; L4-L5 moderate to severe left and moderate right neural foraminal narrowing with mild to moderate spinal canal stenosis. ortho consulted - will fu recs
pending MARY and Mri
MR L-spine (1/9/24) revealed L2-L3 moderate to severe spinal canal stenosis and left lateral recess narrowing with mass effect on the left greater than the right descending nerve roots; L4-L5 moderate to severe left and moderate right neural foraminal narrowing with mild to moderate spinal canal stenosis. ortho consulted - plan for outpt for and rehab -  Pt was seen by PT again and is able to ambulate well -- will go home w home PT and fu ortho in 2 weeks

## 2024-01-10 NOTE — PROGRESS NOTE ADULT - SUBJECTIVE AND OBJECTIVE BOX
Ortho Note  Patient seen and examined at bedside  Pt comfortable without complaints, pain controlled  Denies CP, SOB, N/V, numbness/tingling     Vital Signs Last 24 Hrs  T(C): 36.9 (01-10-24 @ 04:45), Max: 36.9 (01-10-24 @ 04:45)  T(F): 98.4 (01-10-24 @ 04:45), Max: 98.4 (01-10-24 @ 04:45)  HR: 78 (01-10-24 @ 05:40) (76 - 78)  BP: 131/70 (01-10-24 @ 05:40) (131/70 - 144/84)  BP(mean): --  RR: 17 (01-10-24 @ 05:40) (17 - 18)  SpO2: 96% (01-10-24 @ 05:40) (95% - 96%)  I&O's Summary    08 Jan 2024 07:01  -  09 Jan 2024 07:00  --------------------------------------------------------  IN: 0 mL / OUT: 3450 mL / NET: -3450 mL    09 Jan 2024 07:01  -  10 Jakub 2024 05:59  --------------------------------------------------------  IN: 240 mL / OUT: 1400 mL / NET: -1160 mL        General: Pt Alert and oriented, NAD  Pulses: 2+ DP pulses bilaterally, symmetric, extremities warm and well perfused, capillary refill brisk  Sensation: SILT bilat  Motor: Unable to SLR without assistance LLE, able to SLR on RLE, able to hold left knee flexed against resistance, TA/GS/EHL/FHL 5/5 bilateral lower extremities                          13.4   12.05 )-----------( 227      ( 09 Jan 2024 05:30 )             40.4     01-09    135  |  100  |  11  ----------------------------<  105<H>  4.2   |  25  |  0.74    Ca    9.6      09 Jan 2024 05:30  Phos  3.5     01-09  Mg     2.2     01-09        A/P: 73yFemale with L2-L3 and L4-L5 lumbar stenosis    -MRI lumbar spine showing severe lumbar stenosis at L2-L3 with mass effect on descending nerve roots  -Weight bearing status- WBAT   -Mobilize with physical therapy  -Pain control  -Discussed surgery vs outpt tx. Plan for MARY w/ outpatient followup 1-2 weeks with Dr. shell

## 2024-01-10 NOTE — DISCHARGE NOTE NURSING/CASE MANAGEMENT/SOCIAL WORK - NSDCPEFALRISK_GEN_ALL_CORE
For information on Fall & Injury Prevention, visit: [email or web address] OR  [email or web address] OR  https://www.cdc.gov/steadi/patient.html For information on Fall & Injury Prevention, visit: [email or web address] OR  [email or web address] OR  https://www.cdc.gov/steadi/patient.html

## 2024-01-10 NOTE — PROGRESS NOTE ADULT - ASSESSMENT
74 y/o F with history of HTN, sciatica, breast CA (remission) presents c/o left buttock and L groin pain and muscle spasms radiating to left leg today, admitted for further management and PT eval, found to have severe multilevel spinal stenosis w/ mass effect on cauda equina, pending orthopedic surgery eval and MARY placement.

## 2024-01-10 NOTE — PROGRESS NOTE ADULT - PROBLEM SELECTOR PLAN 1
P/w left buttock pain and muscle spasms radiating to left leg. She has had similar episodes in the past.   *CT L-spine shows:  No evidence of acute osseous fracture or jacy dislocation. Multilevel degenerative change of lumbar spine, most significantly at the L2-L3 level where there is moderate to severe narrowing of the spinal canal. In the setting of neuropathy, recommend MRI of the lumbar spine  for further evaluation. No s/s of saddle anesthesia, bowel/bladder incontinence. Walks independently.  *S/p tramadol, morphine 2mg x1, methocarbamol 750mg x1, ketorolac 15mg x1, and valium 5mg x1.  *Neuro exam: MSK 5/5, sensation intact  *MR L-spine (1/9/24) revealed L2-L3 moderate to severe spinal canal stenosis and left lateral recess narrowing with mass effect on the left greater than the right descending nerve roots; L4-L5 moderate to severe left and moderate right neural foraminal narrowing with mild to moderate spinal canal stenosis.    Plan:  - Consult orthopedics - would appreciate recs  - Pain control; Tylenol for mild, lidocaine patch to affected area  - C/w gabapentin 100mg BID  - F/u outpatient with orthopedics (Dr Chuck Shah January 17th at 10:45am) P/w left buttock pain and muscle spasms radiating to left leg. She has had similar episodes in the past.   *CT L-spine shows:  No evidence of acute osseous fracture or jacy dislocation. Multilevel degenerative change of lumbar spine, most significantly at the L2-L3 level where there is moderate to severe narrowing of the spinal canal. In the setting of neuropathy, recommend MRI of the lumbar spine  for further evaluation. No s/s of saddle anesthesia, bowel/bladder incontinence. Walks independently.  *S/p tramadol, morphine 2mg x1, methocarbamol 750mg x1, ketorolac 15mg x1, and valium 5mg x1.  *Neuro exam: MSK 5/5, sensation intact  *MR L-spine (1/9/24) revealed L2-L3 moderate to severe spinal canal stenosis and left lateral recess narrowing with mass effect on the left greater than the right descending nerve roots; L4-L5 moderate to severe left and moderate right neural foraminal narrowing with mild to moderate spinal canal stenosis.    Plan:  - F/u orthopedics - would appreciate recs. Patient amendable to further surgery/procedures during this admission.  - Pain control; Tylenol for mild, lidocaine patch to affected area; add flexeril  - C/w gabapentin 100mg BID  - Has scheduled orthopedics (Dr Chuck Shah January 17th at 10:45am) P/w left buttock pain and muscle spasms radiating to left leg. She has had similar episodes in the past.   *CT L-spine shows:  No evidence of acute osseous fracture or jacy dislocation. Multilevel degenerative change of lumbar spine, most significantly at the L2-L3 level where there is moderate to severe narrowing of the spinal canal. In the setting of neuropathy, recommend MRI of the lumbar spine  for further evaluation. No s/s of saddle anesthesia, bowel/bladder incontinence. Walks independently.  *S/p tramadol, morphine 2mg x1, methocarbamol 750mg x1, ketorolac 15mg x1, and valium 5mg x1.  *Neuro exam: MSK 5/5, sensation intact  *MR L-spine (1/9/24) revealed L2-L3 moderate to severe spinal canal stenosis and left lateral recess narrowing with mass effect on the left greater than the right descending nerve roots; L4-L5 moderate to severe left and moderate right neural foraminal narrowing with mild to moderate spinal canal stenosis.    Plan:  - F/u orthopedics outpatient with Dr. Jiang on January 16th, 2:15pm.  - Pain control; Tylenol for mild, lidocaine patch to affected area; add flexeril  - C/w gabapentin 100mg BID

## 2024-01-16 ENCOUNTER — APPOINTMENT (OUTPATIENT)
Dept: ORTHOPEDIC SURGERY | Facility: CLINIC | Age: 74
End: 2024-01-16
Payer: MEDICARE

## 2024-01-16 VITALS — WEIGHT: 160 LBS | HEIGHT: 65 IN | BODY MASS INDEX: 26.66 KG/M2

## 2024-01-16 DIAGNOSIS — Z86.79 PERSONAL HISTORY OF OTHER DISEASES OF THE CIRCULATORY SYSTEM: ICD-10-CM

## 2024-01-16 DIAGNOSIS — I10 ESSENTIAL (PRIMARY) HYPERTENSION: ICD-10-CM

## 2024-01-16 DIAGNOSIS — Z87.891 PERSONAL HISTORY OF NICOTINE DEPENDENCE: ICD-10-CM

## 2024-01-16 DIAGNOSIS — M54.16 RADICULOPATHY, LUMBAR REGION: ICD-10-CM

## 2024-01-16 DIAGNOSIS — M48.061 SPINAL STENOSIS, LUMBAR REGION WITHOUT NEUROGENIC CLAUDICATION: ICD-10-CM

## 2024-01-16 DIAGNOSIS — Z78.9 OTHER SPECIFIED HEALTH STATUS: ICD-10-CM

## 2024-01-16 DIAGNOSIS — Z85.3 PERSONAL HISTORY OF MALIGNANT NEOPLASM OF BREAST: ICD-10-CM

## 2024-01-16 DIAGNOSIS — Z79.899 OTHER LONG TERM (CURRENT) DRUG THERAPY: ICD-10-CM

## 2024-01-16 PROBLEM — Z00.00 ENCOUNTER FOR PREVENTIVE HEALTH EXAMINATION: Status: ACTIVE | Noted: 2024-01-16

## 2024-01-16 PROCEDURE — 99204 OFFICE O/P NEW MOD 45 MIN: CPT | Mod: 25

## 2024-01-16 PROCEDURE — 72110 X-RAY EXAM L-2 SPINE 4/>VWS: CPT

## 2024-01-16 RX ORDER — CYCLOBENZAPRINE HYDROCHLORIDE 5 MG/1
5 TABLET, FILM COATED ORAL 3 TIMES DAILY
Qty: 42 | Refills: 1 | Status: ACTIVE | COMMUNITY
Start: 2024-01-16 | End: 1900-01-01

## 2024-01-17 ENCOUNTER — APPOINTMENT (OUTPATIENT)
Dept: ORTHOPEDIC SURGERY | Facility: CLINIC | Age: 74
End: 2024-01-17
Payer: MEDICARE

## 2024-01-17 VITALS
TEMPERATURE: 98.2 F | HEIGHT: 65 IN | SYSTOLIC BLOOD PRESSURE: 143 MMHG | WEIGHT: 160 LBS | HEART RATE: 82 BPM | DIASTOLIC BLOOD PRESSURE: 79 MMHG | OXYGEN SATURATION: 99 % | BODY MASS INDEX: 26.66 KG/M2

## 2024-01-17 PROCEDURE — 99215 OFFICE O/P EST HI 40 MIN: CPT

## 2024-01-17 NOTE — PHYSICAL EXAM
[de-identified] : General: No acute distress, conversant, well-nourished. Head: Normocephalic, atraumatic Neck: trachea midline, FROM Heart: normotensive and normal rate and rhythm Lungs: No labored breathing Skin: No abrasions, no rashes, no edema Psych: Alert and oriented to person, place and time Extremities: no peripheral edema or digital cyanosis Vascular: warm and well perfused distally, palpable distal pulses MSK: Lumbar spine: No tenderness to palpation.  No step-off, no deformity.  NEURO EXAM: Sensation  Left L2  -  2/2             Left L3  -  2/2 Left L4  -  2/2 Left L5  -  1/2 Left S1  -  1/2  Right L2  -  2/2             Right L3  -  2/2 Right L4  -  2/2 Right L5  -  1/2 Right S1  -  1/2  Motor:  Left L2 (hip flexion)                            5/5                 Left L3 (knee extension)                   5/5                 Left L4 (ankle dorsiflexion)                 5/5                 Left L5 (long toe extensor)                5/5                 Left S1 (ankle plantar flexion)           5/5  Right L2 (hip flexion)                            5/5                 Right L3 (knee extension)                   5/5                 Right L4 (ankle dorsiflexion)                 5/5                 Right L5 (long toe extensor)                5/5                 Right S1 (ankle plantar flexion)           5/5  Reflexes: Normal and symmetric Negative clonus.  Down-going Babinski.    [de-identified] : I ordered radiographs to evaluate the patient's symptoms. Lumbar 4 view radiographs taken in the office today show no dislocation or fracture.  Lumbar spondylosis.  No instability on dynamic series.  I independently reviewed her lumbar MRI which shows degenerative changes including severe stenosis.

## 2024-01-17 NOTE — HISTORY OF PRESENT ILLNESS
[de-identified] : 73 year old female presents with acute exacerbation of chronic low back pain. The pain radiates to her legs left worse than right. She has numbness in her feet.  She feels weaker in the left leg but notes it is improving.  She denies recent illness, fevers, saddle anesthesia, urinary retention or fecal incontinence.  She was recently hospitalized at [facility].  She takes gabapentin which helps.

## 2024-01-17 NOTE — ASSESSMENT
[FreeTextEntry1] : 73 year old female presents with acute exacerbation of chronic low back pain. The pain radiates to her legs left worse than right. She has numbness in her feet.  She feels weaker in the left leg but notes it is improving.  She denies recent illness, fevers, saddle anesthesia, urinary retention or fecal incontinence.  She was recently hospitalized at [facility].  I independently reviewed her lumbar MRI which shows degenerative changes including severe stenosis.  We discussed treatment options including physical therapy, medications, spinal injections and surgery.  Surgery would be lumbar decompression.  I discussed the risks and benefits.  She would like to avoid surgery. The patient was given a referral for consideration for spinal injections. The patient was given a referral for physical therapy.  She can continue gabapentin. Given cyclobenzaprine. F/U in 3-4 weeks. We discussed red flag symptoms that would require emergent evaluation. She knows to call with any questions or concerns or if her symptoms acutely worsen.

## 2024-01-18 NOTE — HISTORY OF PRESENT ILLNESS
[de-identified] : 73-year-old female presents a new patient evaluation of acute low back and left lower extremity pain.  She describes many episodes over the last several decades of a spasm most frequently occurring on the right side of her low back that tends to resolve within several days.  1/8/2024 days ago she experienced a severe onset of similar symptoms though this time were associated with sensation of pain weakness and some tingling in the left leg diffusely.  She presented to the ER and underwent MRI of the lumbar spine as well as a spine consult was discharged with anti-inflammatory medication and muscle relaxant.  She has initiated physical therapy at home and also has a home attendant sister with activities of daily living.  Feels her symptoms overall have improved considerably and she is beginning to get back to normal activities.  She does have some residual subjective weakness in the left lower extremity without focality.

## 2024-01-18 NOTE — DISCUSSION/SUMMARY
[de-identified] : I reviewed the available imaging with the patient.  I do feel that her acute symptoms are consistent with lumbar radiculopathy however the do not seem to have any focality have improved significantly so it certainly may be due to a muscular spasm that is similarly resolving.  I have not noted any considerable neurologic compression that correlates with her current symptoms on the MRI scan however she does understand the multilevel foraminal stenosis in the lumbar spine.  I recommend ongoing physical therapy and oral anti-inflammatory medications as she has improved considerably and would like her to keep us informed of her progress.  We may consider epidural steroid injection if that this fails to continue with full resolution  I have spent greater than 60 minutes preparing to see the patient, collecting relevant history, performing a thorough history and physical examination, counseling the patient regarding my findings ordering the appropriate therapies and tests, communicating with other relevant healthcare professionals, documenting my encounter and coordinating care.

## 2024-01-18 NOTE — ASSESSMENT
[FreeTextEntry1] :  73-year-old female with resolving left-sided lumbar spasm with associated lower extremity pain and weakness likely secondary to acute lumbar radiculopathy

## 2024-01-18 NOTE — PHYSICAL EXAM
[UE/LE] : Sensory: Intact in bilateral upper & lower extremities [Normal Touch] : sensation intact for touch [Normal Proprioception] : sensation intact for proprioception [Normal] : No costovertebral angle tenderness and no spinal tenderness [de-identified] : Left lower extremity 4+ out of 5 diffusely Right lower extremity 5-5 throughout [de-identified] : MRI lumbar spine 1/8/2024 care stream: Broad-based disc bulge and facet hypertrophy results in mild to moderate stenosis at L2-3 L3-4 and L4-5.  There is mild bilateral foraminal stenosis on the right and moderate to severe on the left at these levels.  Disc base heights are relatively well-maintained.  L5-S1 minimal degenerative change.  L1-2 minimal degenerative change  4 view lumbar spine x-ray 1/16/2024 Ortho PACS: This space height relatively well-maintained.  Degenerative changes with anterior osteophyte formation multiple levels.  No evidence of instability.  No significant coronal abnormality.

## 2024-02-15 ENCOUNTER — APPOINTMENT (OUTPATIENT)
Dept: ORTHOPEDIC SURGERY | Facility: CLINIC | Age: 74
End: 2024-02-15
Payer: MEDICARE

## 2024-02-15 PROCEDURE — 99214 OFFICE O/P EST MOD 30 MIN: CPT

## 2024-02-15 RX ORDER — TIZANIDINE 2 MG/1
2 TABLET ORAL
Qty: 40 | Refills: 2 | Status: ACTIVE | COMMUNITY
Start: 2024-02-15 | End: 1900-01-01

## 2024-02-15 NOTE — HISTORY OF PRESENT ILLNESS
[de-identified] : 73 year old female followup with acute exacerbation of chronic low back pain. The pain radiates to her legs left worse than right. She has numbness in her feet.  She denies recent illness, fevers, saddle anesthesia, urinary retention or fecal incontinence.  Since her last visit her leg pain and back pain has improved.  She is starting PT. Gabapentin and muscle relaxants help.

## 2024-02-15 NOTE — ASSESSMENT
[FreeTextEntry1] : 73 year old female followup with acute exacerbation of chronic low back pain. The pain radiates to her legs left worse than right. She has numbness in her feet.  She denies recent illness, fevers, saddle anesthesia, urinary retention or fecal incontinence.  Since her last visit her leg pain and back pain has improved.  She is starting PT. Gabapentin and muscle relaxants help.  I independently reviewed her lumbar MRI which shows degenerative changes including severe stenosis.  We discussed treatment options including physical therapy, medications, spinal injections.. She can consider spinal injection.  Continue PT. The patient was given a referral for physical therapy.  Given tizanidine. Continue gabapentin. F/U in 6-8 weeks. We discussed red flag symptoms that would require emergent evaluation.  She knows to call with any questions or concerns or if her symptoms acutely worsen.

## 2024-02-15 NOTE — PHYSICAL EXAM
[de-identified] : General: No acute distress, conversant, well-nourished. Head: Normocephalic, atraumatic Neck: trachea midline, FROM Heart: normotensive and normal rate and rhythm Lungs: No labored breathing Skin: No abrasions, no rashes, no edema Psych: Alert and oriented to person, place and time Extremities: no peripheral edema or digital cyanosis Vascular: warm and well perfused distally, palpable distal pulses MSK: Lumbar spine: No tenderness to palpation.  No step-off, no deformity.  NEURO EXAM: Sensation  Left L2  -  2/2             Left L3  -  2/2 Left L4  -  2/2 Left L5  -  1/2 Left S1  -  1/2  Right L2  -  2/2             Right L3  -  2/2 Right L4  -  2/2 Right L5  -  1/2 Right S1  -  1/2  Motor:  Left L2 (hip flexion)                            5/5                 Left L3 (knee extension)                   5/5                 Left L4 (ankle dorsiflexion)                 5/5                 Left L5 (long toe extensor)                5/5                 Left S1 (ankle plantar flexion)           5/5  Right L2 (hip flexion)                            5/5                 Right L3 (knee extension)                   5/5                 Right L4 (ankle dorsiflexion)                 5/5                 Right L5 (long toe extensor)                5/5                 Right S1 (ankle plantar flexion)           5/5  Reflexes: Normal and symmetric Negative clonus.  Down-going Babinski.    [de-identified] : Lumbar 4 view radiographs no dislocation or fracture.  Lumbar spondylosis.  No instability on dynamic series.  I independently reviewed her lumbar MRI which shows degenerative changes including severe stenosis.

## 2024-04-11 ENCOUNTER — APPOINTMENT (OUTPATIENT)
Dept: ORTHOPEDIC SURGERY | Facility: CLINIC | Age: 74
End: 2024-04-11
Payer: MEDICARE

## 2024-04-11 PROCEDURE — 99214 OFFICE O/P EST MOD 30 MIN: CPT

## 2024-04-11 RX ORDER — GABAPENTIN 100 MG/1
100 CAPSULE ORAL 3 TIMES DAILY
Qty: 90 | Refills: 1 | Status: ACTIVE | COMMUNITY
Start: 2024-04-11 | End: 1900-01-01

## 2024-04-11 RX ORDER — TIZANIDINE 2 MG/1
2 TABLET ORAL
Qty: 40 | Refills: 1 | Status: ACTIVE | COMMUNITY
Start: 2024-04-11 | End: 1900-01-01

## 2024-04-11 NOTE — ASSESSMENT
[FreeTextEntry1] : 73 year old female followup with acute exacerbation of chronic low back pain. The pain radiates to her legs left worse than right. She has numbness in her feet. Since her last visit, patient received 2 spinal injections. She has noticed improvement with them. She is planning to get an RFA on 4/19/24. Gabapentin and muscle relaxants also help. She is doing PT with improvement. Pt had confusion with PCP and was sent to Dr. Hatfield at [facility] for her neck and a new cervical MRI was done 4/6/24. She denies recent illness, fevers, weakness, balance problems, saddle anesthesia, urinary retention or fecal incontinence. I independently reviewed her cervical MRI which shows degenerative changes including severe stenosis at C5-C7 with cord compression.  She will be sent for thoracic MRI. She can continue PT.  She was given tizanidine and gabapentin.  F/U after MRI. We discussed red flag symptoms that would require emergent evaluation. She knows to call with any questions or concerns or if her symptoms acutely worsen.

## 2024-04-11 NOTE — PHYSICAL EXAM
[de-identified] : General: No acute distress, conversant, well-nourished. Head: Normocephalic, atraumatic Neck: trachea midline, FROM Heart: normotensive and normal rate and rhythm Lungs: No labored breathing Skin: No abrasions, no rashes, no edema Psych: Alert and oriented to person, place and time Extremities: no peripheral edema or digital cyanosis Gait: Normal gait. Can perform tandem gait.   Vascular: warm and well perfused distally, palpable distal pulses MSK: Spine:  No tenderness to palpation.  No step-off, no deformity.  NEURO: Sensation           Left            C5     2/2                C6     2/2                C7     2/2                C8     2/2               T1     2/2                        Right          C5     2/2                C6     2/2                C7     2/2                C8     2/2               T1     2/2        Motor:                                                 Left              C5 (deltoid abduction)             5/5                C6 (biceps flexion)                   5/5                 C7 (triceps extension)             5/5                C8 (finger flexion)                     5/5                T1 (interosseous)                     5/5                                                            Right            C5 (deltoid abduction)             5/5                C6 (biceps flexion)                   5/5                 C7 (triceps extension)             5/5                C8 (finger flexion)                     5/5                T1 (interosseous)                     5/5                       Sensation  Left L2  -  2/2             Left L3  -  2/2 Left L4  -  2/2 Left L5  -  2/2 Left S1  -  2/2  Right L2  -  2/2             Right L3  -  2/2 Right L4  -  2/2 Right L5  -  2/2 Right S1  -  2/2  Motor:  Left L2 (hip flexion)                            5/5                 Left L3 (knee extension)                   5/5                 Left L4 (ankle dorsiflexion)                 5/5                 Left L5 (long toe extensor)                5/5                 Left S1 (ankle plantar flexion)           5/5  Right L2 (hip flexion)                            5/5                 Right L3 (knee extension)                   5/5                 Right L4 (ankle dorsiflexion)                 5/5                 Right L5 (long toe extensor)                5/5                 Right S1 (ankle plantar flexion)           5/5  Reflexes: Normal and symmetric Negative Spurlings test.  Negative Hoffmans reflex.   Negative clonus.  Down-going Babinski. [de-identified] : I independently reviewed her cervical MRI which shows degenerative changes including severe stenosis at C5-C7 with cord compression.    Lumbar 4 view radiographs no dislocation or fracture.  Lumbar spondylosis.  No instability on dynamic series.  I independently reviewed her lumbar MRI which shows degenerative changes including severe stenosis.

## 2024-04-11 NOTE — HISTORY OF PRESENT ILLNESS
[de-identified] : 73 year old female followup with acute exacerbation of chronic low back pain. The pain radiates to her legs left worse than right. She has numbness in her feet. Since her last visit, patient received 2 spinal injections. She has noticed improvement with them. She is planning to get an RFA on 4/19/24. Gabapentin and muscle relaxants also help. She is doing PT with improvement. Pt had confusion with PCP and was sent to Dr. Hatfield at [facility] for her neck and a new cervical MRI was done 4/6/24. She denies recent illness, fevers, weakness, balance problems, saddle anesthesia, urinary retention or fecal incontinence.

## 2024-05-23 ENCOUNTER — APPOINTMENT (OUTPATIENT)
Dept: ORTHOPEDIC SURGERY | Facility: CLINIC | Age: 74
End: 2024-05-23
Payer: MEDICARE

## 2024-05-23 DIAGNOSIS — M48.062 SPINAL STENOSIS, LUMBAR REGION WITH NEUROGENIC CLAUDICATION: ICD-10-CM

## 2024-05-23 DIAGNOSIS — M54.16 RADICULOPATHY, LUMBAR REGION: ICD-10-CM

## 2024-05-23 PROCEDURE — 99214 OFFICE O/P EST MOD 30 MIN: CPT

## 2024-05-23 RX ORDER — GABAPENTIN 100 MG/1
100 CAPSULE ORAL 3 TIMES DAILY
Qty: 90 | Refills: 1 | Status: ACTIVE | COMMUNITY
Start: 2024-05-23 | End: 1900-01-01

## 2024-05-23 RX ORDER — TIZANIDINE 2 MG/1
2 TABLET ORAL
Qty: 80 | Refills: 1 | Status: ACTIVE | COMMUNITY
Start: 2024-05-23 | End: 1900-01-01

## 2024-05-23 NOTE — PHYSICAL EXAM
[de-identified] :  General: No acute distress, conversant, well-nourished. Head: Normocephalic, atraumatic Neck: trachea midline, FROM Heart: normotensive and normal rate and rhythm Lungs: No labored breathing Skin: No abrasions, no rashes, no edema Psych: Alert and oriented to person, place and time Extremities: no peripheral edema or digital cyanosis Gait: Normal gait. Can perform tandem gait.  Vascular: warm and well perfused distally, palpable distal pulses  MSK: Spine: No tenderness to palpation.  No step-off, no deformity.   NEURO: Sensation          Left           C5     2/2               C6     2/2               C7     2/2               C8     2/2              T1     2/2                        Right         C5     2/2               C6     2/2               C7     2/2               C8     2/2              T1     2/2        Motor:                                                Left             C5 (deltoid abduction)             5/5               C6 (biceps flexion)                   5/5                C7 (triceps extension)             5/5               C8 (finger flexion)                     5/5               T1 (interosseous)                     5/5                                                            Right           C5 (deltoid abduction)             5/5               C6 (biceps flexion)                   5/5                C7 (triceps extension)             5/5               C8 (finger flexion)                     5/5               T1 (interosseous)                     5/5                       Sensation Left L2  -  2/2            Left L3  -  2/2 Left L4  -  2/2 Left L5  -  2/2 Left S1  -  2/2   Right L2  -  2/2            Right L3  -  2/2 Right L4  -  2/2 Right L5  -  2/2 Right S1  -  2/2   Motor: Left L2 (hip flexion)                            5/5                Left L3 (knee extension)                   5/5                Left L4 (ankle dorsiflexion)                 5/5                Left L5 (long toe extensor)                5/5                Left S1 (ankle plantar flexion)           5/5   Right L2 (hip flexion)                            5/5                Right L3 (knee extension)                   5/5                Right L4 (ankle dorsiflexion)                 5/5                Right L5 (long toe extensor)                5/5                Right S1 (ankle plantar flexion)           5/5   Reflexes: Normal and symmetric Negative Spurlings test.  Negative Hoffmans reflex.  Negative clonus.  Down-going Babinski.  [de-identified] : I independently reviewed her thoracic MRI which shows degenerative changes without compression of the neural elements. She will see her PCP regarding lung findings.    [facility] thoracic MRI from 4/20/24:  IMPRESSION:  MRI of the thoracic spine demonstrates:  Multilevel degenerative changes of the thoracic spine, as detailed. Limited evaluation of the T1 vertebral body/posterior elements due to partial exclusion from the field-of-view on some sequences.  Questionable nodular signal intensity in the right lung versus artifact which can be correlated with chest CT as clinically indicated.

## 2024-05-23 NOTE — HISTORY OF PRESENT ILLNESS
[de-identified] : 73 year old female followup with acute exacerbation of chronic low back pain. The pain radiates to her legs left worse than right. She has numbness in her feet.  Since her last visit, patient received 2 spinal injections. She has noticed improvement with them. She is planning to get an ablation on 4/19/24.  She denies recent illness, fevers, saddle anesthesia, urinary retention or fecal incontinence.  Since previous visit, patient received 2 RFA injections and is having great relief in her back pain. She has some intermittent pain in her legs.

## 2024-05-23 NOTE — ASSESSMENT
[FreeTextEntry1] : 73 year old female followup with acute exacerbation of chronic low back pain. The pain radiates to her legs left worse than right. She has numbness in her feet.  Since her last visit, patient received 2 spinal injections. She has noticed improvement with them. She is planning to get an ablation with on 4/19/24.  She denies recent illness, fevers, saddle anesthesia, urinary retention or fecal incontinence.  Since previous visit, patient received 2 RFA injections and is having great relief in her back pain. She has some intermittent pain in her legs. Continue PT. Continue tizanidine. Continue gabapentin. The patient was given a referral for consideration for lumbar epidural injection. Recommended to try magnesium supplements. Follow up in 4-6 weeks. We discussed red flag symptoms that would require emergent evaluation. She knows to call with any questions or concerns or if her symptoms acutely worsen.

## 2024-05-23 NOTE — REASON FOR VISIT
[Follow-Up Visit] : a follow-up visit for [Back Pain] : back pain [Other: ____] : [unfilled] [FreeTextEntry2] : bilateral leg pain 7/10

## 2024-07-09 ENCOUNTER — APPOINTMENT (OUTPATIENT)
Dept: ORTHOPEDIC SURGERY | Facility: CLINIC | Age: 74
End: 2024-07-09
Payer: MEDICARE

## 2024-07-09 DIAGNOSIS — M54.16 RADICULOPATHY, LUMBAR REGION: ICD-10-CM

## 2024-07-09 DIAGNOSIS — M48.062 SPINAL STENOSIS, LUMBAR REGION WITH NEUROGENIC CLAUDICATION: ICD-10-CM

## 2024-07-09 PROCEDURE — 99214 OFFICE O/P EST MOD 30 MIN: CPT

## 2024-08-06 ENCOUNTER — APPOINTMENT (OUTPATIENT)
Dept: ORTHOPEDIC SURGERY | Facility: CLINIC | Age: 74
End: 2024-08-06

## 2024-08-06 PROCEDURE — 99214 OFFICE O/P EST MOD 30 MIN: CPT

## 2024-08-06 NOTE — PHYSICAL EXAM
[de-identified] : General: No acute distress, conversant, well-nourished. Head: Normocephalic, atraumatic Neck: trachea midline, FROM Heart: normotensive and normal rate and rhythm Lungs: No labored breathing Skin: No abrasions, no rashes, no edema Psych: Alert and oriented to person, place and time Extremities: no peripheral edema or digital cyanosis Gait: Normal gait. Can perform tandem gait.   Vascular: warm and well perfused distally, palpable distal pulses MSK: Spine:  No tenderness to palpation.  No step-off, no deformity.  NEURO: Sensation           Left            C5     2/2                C6     2/2                C7     2/2                C8     2/2               T1     2/2                        Right          C5     2/2                C6     2/2                C7     2/2                C8     2/2               T1     2/2        Motor:                                                 Left              C5 (deltoid abduction)             5/5                C6 (biceps flexion)                   5/5                 C7 (triceps extension)             5/5                C8 (finger flexion)                     5/5                T1 (interosseous)                     5/5                                                            Right            C5 (deltoid abduction)             5/5                C6 (biceps flexion)                   5/5                 C7 (triceps extension)             5/5                C8 (finger flexion)                     5/5                T1 (interosseous)                     5/5                       Sensation  Left L2  -  2/2             Left L3  -  2/2 Left L4  -  2/2 Left L5  -  2/2 Left S1  -  2/2  Right L2  -  2/2             Right L3  -  2/2 Right L4  -  2/2 Right L5  -  2/2 Right S1  -  2/2  Motor:  Left L2 (hip flexion)                            5/5                 Left L3 (knee extension)                   5/5                 Left L4 (ankle dorsiflexion)                 5/5                 Left L5 (long toe extensor)                5/5                 Left S1 (ankle plantar flexion)           5/5  Right L2 (hip flexion)                            5/5                 Right L3 (knee extension)                   5/5                 Right L4 (ankle dorsiflexion)                 5/5                 Right L5 (long toe extensor)                5/5                 Right S1 (ankle plantar flexion)           5/5  Reflexes: Normal and symmetric Negative Spurlings test.  Negative Hoffmans reflex.   Negative clonus.  Down-going Babinski. [de-identified] : I independently reviewed her thoracic MRI which shows degenerative changes without compression of the neural elements. She will see her PCP regarding lung findings.    [facility] thoracic MRI from 4/20/24:  IMPRESSION:  MRI of the thoracic spine demonstrates:  Multilevel degenerative changes of the thoracic spine, as detailed. Limited evaluation of the T1 vertebral body/posterior elements due to partial exclusion from the field-of-view on some sequences.  Questionable nodular signal intensity in the right lung versus artifact which can be correlated with chest CT as clinically indicated.

## 2024-08-06 NOTE — REVIEW OF SYSTEMS
[Normal] : affect appropriate [de-identified] : overweight [Negative] : Heme/Lymph [de-identified] : mild conjunctival pallor [FreeTextEntry9] : back pain [de-identified] : RRR, S1, S2, ?murmur [de-identified] : Has thinning hair, alopecia

## 2024-08-06 NOTE — ASSESSMENT
[FreeTextEntry1] : 73 year old female followup with acute exacerbation of chronic low back pain. The pain radiates to her legs left worse than right. She has numbness in her feet.  She was doing well however she reports increased left leg pain.  Her cramping is better with Magnesium.  She declined spinal injections because her pain was improved at that time but now it has returned. She denies recent illness, fevers, saddle anesthesia, urinary retention or fecal incontinence. She takes tizanidine which helps. She can continue tizanidine prn. The patient was given a referral for consideration for additional spinal injections. She will discuss with her oncologist findings of thoracic MRI. Can continue PT or HEP.  F/U 4-6 weeks. We discussed red flag symptoms that would require emergent evaluation. She knows to call with any questions or concerns or if her symptoms acutely worsen.

## 2024-08-06 NOTE — HISTORY OF PRESENT ILLNESS
[de-identified] : 73 year old female followup with acute exacerbation of chronic low back pain. The pain radiates to her legs left worse than right. She has numbness in her feet.  She was doing well however she reports increased left leg pain.  Her cramping is better with Magnesium.  She declined spinal injections because her pain was improved at that time but now it has returned. She denies recent illness, fevers, saddle anesthesia, urinary retention or fecal incontinence. She takes tizanidine which helps.

## 2024-09-10 NOTE — DISCHARGE NOTE PROVIDER - CARE PROVIDER_API CALL
Detail Level: Generalized Detail Level: Zone Detail Level: Detailed JEANIE ARTEAGA  [address] [place] [address]  Phone: ()-  Fax: ()-  Established Patient  Follow Up Time: 2 weeks   JEANIE ARTEAGA  [address] [place] [address]  Phone: ()-  Fax: ()-  Established Patient  Follow Up Time: 2 weeks   JEANIE ARTEAGA  [address] [place] [address]  Phone: ()-  Fax: ()-  Established Patient  Scheduled Appointment: 01/22/2024 04:15 PM   JEANIE ARTEAGA  [address] [place] [address]  Phone: ()-  Fax: ()-  Established Patient  Scheduled Appointment: 01/22/2024 04:15 PM   JEANIE ARTEAGA  [address] [place] [address]  Phone: ()-  Fax: ()-  Established Patient  Scheduled Appointment: 01/22/2024 04:15 PM    Chuck Shah  Orthopaedic Surgery  [address]  [place] [address]  Phone: [phone]  Fax: [phone]  Follow Up Time: 2 weeks   JEANIE ARTEAGA  [address] [place] [address]  Phone: ()-  Fax: ()-  Established Patient  Scheduled Appointment: 01/22/2024 04:15 PM    Chuck Shah  Orthopaedic Surgery  [address]  [place] [address]  Phone: [phone]  Fax: [phone]  Follow Up Time: 2 weeks   JEANIE ARTEAGA  [address] [place] [address]  Phone: ()-  Fax: ()-  Established Patient  Scheduled Appointment: 01/22/2024 04:15 PM    Chuck Shah  Orthopaedic Surgery  [address]  [place] [address]  Phone: [phone]  Fax: [phone]  Scheduled Appointment: 01/17/2024 10:45 AM   JEANIE ARTEAGA  [address] [place] [address]  Phone: ()-  Fax: ()-  Established Patient  Scheduled Appointment: 01/22/2024 04:15 PM    Chuck Shah  Orthopaedic Surgery  [address]  [place] [address]  Phone: [phone]  Fax: [phone]  Scheduled Appointment: 01/17/2024 10:45 AM

## 2024-09-17 ENCOUNTER — APPOINTMENT (OUTPATIENT)
Dept: ORTHOPEDIC SURGERY | Facility: CLINIC | Age: 74
End: 2024-09-17

## 2024-10-18 ENCOUNTER — APPOINTMENT (OUTPATIENT)
Dept: ORTHOPEDIC SURGERY | Facility: CLINIC | Age: 74
End: 2024-10-18
Payer: MEDICARE

## 2024-10-18 DIAGNOSIS — M54.16 RADICULOPATHY, LUMBAR REGION: ICD-10-CM

## 2024-10-18 DIAGNOSIS — M54.9 DORSALGIA, UNSPECIFIED: ICD-10-CM

## 2024-10-18 DIAGNOSIS — M48.062 SPINAL STENOSIS, LUMBAR REGION WITH NEUROGENIC CLAUDICATION: ICD-10-CM

## 2024-10-18 PROCEDURE — 99214 OFFICE O/P EST MOD 30 MIN: CPT

## 2024-10-18 RX ORDER — TIZANIDINE 2 MG/1
2 TABLET ORAL
Qty: 40 | Refills: 1 | Status: ACTIVE | COMMUNITY
Start: 2024-10-18 | End: 1900-01-01

## 2024-10-18 RX ORDER — GABAPENTIN 100 MG/1
100 CAPSULE ORAL 3 TIMES DAILY
Qty: 90 | Refills: 1 | Status: ACTIVE | COMMUNITY
Start: 2024-10-18 | End: 1900-01-01

## 2024-12-18 ENCOUNTER — APPOINTMENT (OUTPATIENT)
Dept: ORTHOPEDIC SURGERY | Facility: CLINIC | Age: 74
End: 2024-12-18
Payer: MEDICARE

## 2024-12-18 DIAGNOSIS — M54.12 RADICULOPATHY, CERVICAL REGION: ICD-10-CM

## 2024-12-18 PROCEDURE — 99214 OFFICE O/P EST MOD 30 MIN: CPT | Mod: 25

## 2024-12-18 PROCEDURE — 72050 X-RAY EXAM NECK SPINE 4/5VWS: CPT

## 2024-12-18 RX ORDER — TIZANIDINE 2 MG/1
2 TABLET ORAL
Qty: 80 | Refills: 1 | Status: ACTIVE | COMMUNITY
Start: 2024-12-18 | End: 1900-01-01

## 2024-12-18 RX ORDER — GABAPENTIN 100 MG/1
100 CAPSULE ORAL 3 TIMES DAILY
Qty: 90 | Refills: 1 | Status: ACTIVE | COMMUNITY
Start: 2024-12-18 | End: 1900-01-01

## 2024-12-24 ENCOUNTER — NON-APPOINTMENT (OUTPATIENT)
Age: 74
End: 2024-12-24

## 2025-01-01 PROBLEM — K21.9 GASTRO-ESOPHAGEAL REFLUX DISEASE WITHOUT ESOPHAGITIS: Chronic | Status: ACTIVE | Noted: 2024-12-27

## 2025-01-01 PROBLEM — Z87.898 PERSONAL HISTORY OF OTHER SPECIFIED CONDITIONS: Chronic | Status: ACTIVE | Noted: 2024-12-27

## 2025-01-01 PROBLEM — M81.0 AGE-RELATED OSTEOPOROSIS WITHOUT CURRENT PATHOLOGICAL FRACTURE: Chronic | Status: ACTIVE | Noted: 2024-12-27

## 2025-01-01 PROBLEM — J44.9 CHRONIC OBSTRUCTIVE PULMONARY DISEASE, UNSPECIFIED: Chronic | Status: ACTIVE | Noted: 2024-12-27

## 2025-01-01 PROBLEM — J45.909 UNSPECIFIED ASTHMA, UNCOMPLICATED: Chronic | Status: ACTIVE | Noted: 2024-12-27

## 2025-01-01 PROBLEM — I10 ESSENTIAL (PRIMARY) HYPERTENSION: Chronic | Status: ACTIVE | Noted: 2024-12-27

## 2025-01-02 RX ORDER — LIFITEGRAST 50 MG/ML
1 SOLUTION/ DROPS OPHTHALMIC
Refills: 0 | DISCHARGE

## 2025-01-02 RX ORDER — ALENDRONATE SODIUM 5 MG/1
1 TABLET ORAL
Refills: 0 | DISCHARGE

## 2025-01-02 RX ORDER — PROMETHAZINE HCL 25 MG
0 SUPPOSITORY, RECTAL RECTAL
Refills: 0 | DISCHARGE

## 2025-01-02 RX ORDER — POVIDONE IODINE USP, 10% W/W 10 MG/ML
1 SWAB TOPICAL ONCE
Refills: 0 | Status: COMPLETED | OUTPATIENT
Start: 2025-01-03 | End: 2025-01-03

## 2025-01-02 NOTE — ASU PATIENT PROFILE, ADULT - NSICDXPASTSURGICALHX_GEN_ALL_CORE_FT
PAST SURGICAL HISTORY:  Cataract right    H/O breast surgery left - lumpectomy    History of eye evisceration right eye

## 2025-01-02 NOTE — H&P ADULT - NSICDXPASTMEDICALHX_GEN_ALL_CORE_FT
PAST MEDICAL HISTORY:  Asthma     Breast cancer left    COPD, mild     GERD (gastroesophageal reflux disease)     History of weakness     HTN (hypertension)     Neck pain     Osteoporosis     Sciatica      PAST MEDICAL HISTORY:  Asthma     Breast cancer left;  s/p chemo and RT 2005    COPD, mild     GERD (gastroesophageal reflux disease)     HTN (hypertension)     Leg weakness     Neck pain radiculopathy    Osteoporosis     Sciatica

## 2025-01-02 NOTE — H&P ADULT - NSHPPHYSICALEXAM_GEN_ALL_CORE
Gen: Alert and oriented, NAD  MSK: Decreased ROM to cervical spine 2/2 pain    Remainder of PE per medical clearance note Gen: Alert and oriented, NAD  MSK: Decreased ROM to cervical spine 2/2 pain  Skin without erythema, ecchymosis, abrasions or lesions  Calves soft, nontender bilaterally   Sensation intact to light touch bilateral upper extremities  Pulses: +2RP, brisk capillary refill  Motors; C5-T1 5/5 bilateral upper extremities     Remainder of PE per medical clearance note

## 2025-01-02 NOTE — ASU PATIENT PROFILE, ADULT - NSICDXPASTMEDICALHX_GEN_ALL_CORE_FT
PAST MEDICAL HISTORY:  Asthma     Breast cancer left;  s/p chemo and RT 2005    COPD, mild     GERD (gastroesophageal reflux disease)     HTN (hypertension)     Leg weakness     Neck pain radiculopathy    Osteoporosis     Sciatica
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
## 2025-01-02 NOTE — H&P ADULT - PROBLEM SELECTOR PLAN 1
Admit to Orthopaedic Service.  Presents today for elective ACDF C5-C7 w/ ICBG  Pt medically stable and cleared for procedure today by  ____ Admit to Orthopaedic Service.  Presents today for elective ACDF C5-C7 w/ ICBG  Pt medically stable and cleared for procedure today by Dr. Vega

## 2025-01-02 NOTE — ASU PATIENT PROFILE, ADULT - FALL HARM RISK - HARM RISK INTERVENTIONS

## 2025-01-02 NOTE — H&P ADULT - NSHPLABSRESULTS_GEN_ALL_CORE
Preop CBC, BMP, PT/INR, PTT within normal range and reviewed per medical clearance  H/H: 12.7/ 38.9  Cr: 0.9  UA: WNL per medical clearance     Preop EKG 81bpm NSR within normal limits and reviewed per medical clearance  3M: DOS  T + S: DOS

## 2025-01-02 NOTE — H&P ADULT - NSICDXPASTSURGICALHX_GEN_ALL_CORE_FT
PAST SURGICAL HISTORY:  H/O breast biopsy     H/O breast surgery left     PAST SURGICAL HISTORY:  Cataract right    H/O breast surgery left - lumpectomy    History of eye evisceration right eye

## 2025-01-03 ENCOUNTER — APPOINTMENT (OUTPATIENT)
Dept: ORTHOPEDIC SURGERY | Facility: HOSPITAL | Age: 75
End: 2025-01-03

## 2025-01-03 ENCOUNTER — INPATIENT (INPATIENT)
Facility: HOSPITAL | Age: 75
LOS: 2 days | Discharge: HOME | DRG: 472 | End: 2025-01-06
Attending: STUDENT IN AN ORGANIZED HEALTH CARE EDUCATION/TRAINING PROGRAM | Admitting: STUDENT IN AN ORGANIZED HEALTH CARE EDUCATION/TRAINING PROGRAM
Payer: MEDICARE

## 2025-01-03 VITALS
WEIGHT: 156.53 LBS | TEMPERATURE: 97 F | RESPIRATION RATE: 16 BRPM | HEIGHT: 64 IN | HEART RATE: 90 BPM | SYSTOLIC BLOOD PRESSURE: 165 MMHG | DIASTOLIC BLOOD PRESSURE: 81 MMHG | OXYGEN SATURATION: 97 %

## 2025-01-03 DIAGNOSIS — I10 ESSENTIAL (PRIMARY) HYPERTENSION: ICD-10-CM

## 2025-01-03 DIAGNOSIS — H26.9 UNSPECIFIED CATARACT: Chronic | ICD-10-CM

## 2025-01-03 DIAGNOSIS — M48.02 SPINAL STENOSIS, CERVICAL REGION: ICD-10-CM

## 2025-01-03 DIAGNOSIS — J45.909 UNSPECIFIED ASTHMA, UNCOMPLICATED: ICD-10-CM

## 2025-01-03 DIAGNOSIS — Z98.890 OTHER SPECIFIED POSTPROCEDURAL STATES: Chronic | ICD-10-CM

## 2025-01-03 DIAGNOSIS — J44.9 CHRONIC OBSTRUCTIVE PULMONARY DISEASE, UNSPECIFIED: ICD-10-CM

## 2025-01-03 DIAGNOSIS — K21.9 GASTRO-ESOPHAGEAL REFLUX DISEASE WITHOUT ESOPHAGITIS: ICD-10-CM

## 2025-01-03 PROBLEM — Z87.898 PERSONAL HISTORY OF OTHER SPECIFIED CONDITIONS: Chronic | Status: INACTIVE | Noted: 2024-12-27 | Resolved: 2025-01-02

## 2025-01-03 LAB
BLD GP AB SCN SERPL QL: NEGATIVE — SIGNIFICANT CHANGE UP
RH IG SCN BLD-IMP: POSITIVE — SIGNIFICANT CHANGE UP

## 2025-01-03 PROCEDURE — 22551 ARTHRD ANT NTRBDY CERVICAL: CPT | Mod: AS

## 2025-01-03 PROCEDURE — 22552 ARTHRD ANT NTRBD CERVICAL EA: CPT | Mod: AS

## 2025-01-03 PROCEDURE — 20937 SP BONE AGRFT MORSEL ADD-ON: CPT

## 2025-01-03 PROCEDURE — 20937 SP BONE AGRFT MORSEL ADD-ON: CPT | Mod: AS

## 2025-01-03 PROCEDURE — 22845 INSERT SPINE FIXATION DEVICE: CPT | Mod: 59

## 2025-01-03 PROCEDURE — 22551 ARTHRD ANT NTRBDY CERVICAL: CPT

## 2025-01-03 PROCEDURE — 22552 ARTHRD ANT NTRBD CERVICAL EA: CPT

## 2025-01-03 PROCEDURE — 22853 INSJ BIOMECHANICAL DEVICE: CPT | Mod: AS

## 2025-01-03 PROCEDURE — 22845 INSERT SPINE FIXATION DEVICE: CPT | Mod: AS,59

## 2025-01-03 PROCEDURE — 22853 INSJ BIOMECHANICAL DEVICE: CPT

## 2025-01-03 DEVICE — IMPLANTABLE DEVICE: Type: IMPLANTABLE DEVICE | Site: CERVICAL SPINE | Status: FUNCTIONAL

## 2025-01-03 DEVICE — SURGIFOAM PAD 8CM X 12.5CM X 10MM (100): Type: IMPLANTABLE DEVICE | Site: CERVICAL SPINE | Status: FUNCTIONAL

## 2025-01-03 DEVICE — SURGIFLO HEMOSTATIC MATRIX KIT: Type: IMPLANTABLE DEVICE | Site: CERVICAL SPINE | Status: FUNCTIONAL

## 2025-01-03 RX ORDER — TIOTROPIUM BROMIDE MONOHYDRATE 18 UG/1
2 CAPSULE ORAL; RESPIRATORY (INHALATION) DAILY
Refills: 0 | Status: DISCONTINUED | OUTPATIENT
Start: 2025-01-03 | End: 2025-01-05

## 2025-01-03 RX ORDER — HYDROMORPHONE HCL 4 MG
0.5 TABLET ORAL
Refills: 0 | Status: DISCONTINUED | OUTPATIENT
Start: 2025-01-03 | End: 2025-01-06

## 2025-01-03 RX ORDER — PROMETHAZINE HCL 25 MG
25 SUPPOSITORY, RECTAL RECTAL DAILY
Refills: 0 | Status: DISCONTINUED | OUTPATIENT
Start: 2025-01-03 | End: 2025-01-06

## 2025-01-03 RX ORDER — FLUTICASONE PROPIONATE AND SALMETEROL 50; 500 UG/1; UG/1
1 POWDER ORAL; RESPIRATORY (INHALATION)
Refills: 0 | Status: DISCONTINUED | OUTPATIENT
Start: 2025-01-03 | End: 2025-01-05

## 2025-01-03 RX ORDER — POLYSORBATE 80 100 MG/10ML
1 SOLUTION/ DROPS OPHTHALMIC THREE TIMES A DAY
Refills: 0 | Status: DISCONTINUED | OUTPATIENT
Start: 2025-01-03 | End: 2025-01-06

## 2025-01-03 RX ORDER — HYDRALAZINE HYDROCHLORIDE 10 MG/1
5 TABLET ORAL ONCE
Refills: 0 | Status: COMPLETED | OUTPATIENT
Start: 2025-01-03 | End: 2025-01-03

## 2025-01-03 RX ORDER — ACETAMINOPHEN 80 MG/.8ML
1000 SOLUTION/ DROPS ORAL EVERY 8 HOURS
Refills: 0 | Status: DISCONTINUED | OUTPATIENT
Start: 2025-01-03 | End: 2025-01-06

## 2025-01-03 RX ORDER — ACETAMINOPHEN 80 MG/.8ML
1000 SOLUTION/ DROPS ORAL ONCE
Refills: 0 | Status: COMPLETED | OUTPATIENT
Start: 2025-01-03 | End: 2025-01-03

## 2025-01-03 RX ORDER — ONDANSETRON 4 MG/1
4 TABLET ORAL EVERY 6 HOURS
Refills: 0 | Status: DISCONTINUED | OUTPATIENT
Start: 2025-01-03 | End: 2025-01-06

## 2025-01-03 RX ORDER — OXYCODONE HCL 15 MG
5 TABLET ORAL EVERY 4 HOURS
Refills: 0 | Status: DISCONTINUED | OUTPATIENT
Start: 2025-01-03 | End: 2025-01-06

## 2025-01-03 RX ORDER — PANTOPRAZOLE 40 MG/1
40 TABLET, DELAYED RELEASE ORAL
Refills: 0 | Status: DISCONTINUED | OUTPATIENT
Start: 2025-01-03 | End: 2025-01-06

## 2025-01-03 RX ORDER — CHLORHEXIDINE GLUCONATE 1.2 MG/ML
1 RINSE ORAL ONCE
Refills: 0 | Status: COMPLETED | OUTPATIENT
Start: 2025-01-03 | End: 2025-01-03

## 2025-01-03 RX ORDER — METHOCARBAMOL 500 MG
500 TABLET ORAL EVERY 8 HOURS
Refills: 0 | Status: DISCONTINUED | OUTPATIENT
Start: 2025-01-03 | End: 2025-01-06

## 2025-01-03 RX ORDER — DEXAMETHASONE SODIUM PHOSPHATE 4 MG/ML
6 VIAL (ML) INJECTION EVERY 6 HOURS
Refills: 0 | Status: COMPLETED | OUTPATIENT
Start: 2025-01-03 | End: 2025-01-04

## 2025-01-03 RX ORDER — CEFAZOLIN SODIUM 1 G
2000 VIAL (EA) INJECTION EVERY 8 HOURS
Refills: 0 | Status: COMPLETED | OUTPATIENT
Start: 2025-01-03 | End: 2025-01-04

## 2025-01-03 RX ORDER — BENZOCAINE AND MENTHOL 15; 3.6 MG/1; MG/1
1 LOZENGE ORAL
Refills: 0 | Status: DISCONTINUED | OUTPATIENT
Start: 2025-01-03 | End: 2025-01-06

## 2025-01-03 RX ORDER — SODIUM CHLORIDE 9 MG/ML
1000 INJECTION, SOLUTION INTRAVENOUS
Refills: 0 | Status: DISCONTINUED | OUTPATIENT
Start: 2025-01-03 | End: 2025-01-06

## 2025-01-03 RX ORDER — GABAPENTIN 300 MG/1
100 CAPSULE ORAL THREE TIMES A DAY
Refills: 0 | Status: DISCONTINUED | OUTPATIENT
Start: 2025-01-03 | End: 2025-01-06

## 2025-01-03 RX ORDER — HYDROMORPHONE HCL 4 MG
0.5 TABLET ORAL EVERY 6 HOURS
Refills: 0 | Status: DISCONTINUED | OUTPATIENT
Start: 2025-01-03 | End: 2025-01-06

## 2025-01-03 RX ORDER — APREPITANT 40 MG/1
40 CAPSULE ORAL ONCE
Refills: 0 | Status: COMPLETED | OUTPATIENT
Start: 2025-01-03 | End: 2025-01-03

## 2025-01-03 RX ORDER — SENNOSIDES 8.6 MG/1
2 TABLET, FILM COATED ORAL AT BEDTIME
Refills: 0 | Status: DISCONTINUED | OUTPATIENT
Start: 2025-01-03 | End: 2025-01-06

## 2025-01-03 RX ORDER — POLYETHYLENE GLYCOL 3350 17 G/DOSE
17 POWDER (GRAM) ORAL DAILY
Refills: 0 | Status: DISCONTINUED | OUTPATIENT
Start: 2025-01-03 | End: 2025-01-06

## 2025-01-03 RX ADMIN — GABAPENTIN 100 MILLIGRAM(S): 300 CAPSULE ORAL at 23:01

## 2025-01-03 RX ADMIN — SENNOSIDES 2 TABLET(S): 8.6 TABLET, FILM COATED ORAL at 23:00

## 2025-01-03 RX ADMIN — ACETAMINOPHEN 1000 MILLIGRAM(S): 80 SOLUTION/ DROPS ORAL at 10:08

## 2025-01-03 RX ADMIN — HYDRALAZINE HYDROCHLORIDE 5 MILLIGRAM(S): 10 TABLET ORAL at 16:00

## 2025-01-03 RX ADMIN — BENZOCAINE AND MENTHOL 1 LOZENGE: 15; 3.6 LOZENGE ORAL at 16:33

## 2025-01-03 RX ADMIN — Medication 0.5 MILLIGRAM(S): at 16:13

## 2025-01-03 RX ADMIN — Medication 0.5 MILLIGRAM(S): at 16:30

## 2025-01-03 RX ADMIN — CHLORHEXIDINE GLUCONATE 1 APPLICATION(S): 1.2 RINSE ORAL at 10:06

## 2025-01-03 RX ADMIN — APREPITANT 40 MILLIGRAM(S): 40 CAPSULE ORAL at 10:08

## 2025-01-03 RX ADMIN — ONDANSETRON 4 MILLIGRAM(S): 4 TABLET ORAL at 17:18

## 2025-01-03 RX ADMIN — POVIDONE IODINE USP, 10% W/W 1 APPLICATION(S): 10 SWAB TOPICAL at 10:06

## 2025-01-03 RX ADMIN — ACETAMINOPHEN 1000 MILLIGRAM(S): 80 SOLUTION/ DROPS ORAL at 23:01

## 2025-01-03 RX ADMIN — GABAPENTIN 100 MILLIGRAM(S): 300 CAPSULE ORAL at 17:17

## 2025-01-03 RX ADMIN — Medication 6 MILLIGRAM(S): at 23:00

## 2025-01-03 RX ADMIN — Medication 6 MILLIGRAM(S): at 17:17

## 2025-01-03 RX ADMIN — Medication 0.5 MILLIGRAM(S): at 15:42

## 2025-01-03 RX ADMIN — Medication 100 MILLIGRAM(S): at 20:43

## 2025-01-03 RX ADMIN — ACETAMINOPHEN 1000 MILLIGRAM(S): 80 SOLUTION/ DROPS ORAL at 10:30

## 2025-01-03 RX ADMIN — Medication 0.5 MILLIGRAM(S): at 15:27

## 2025-01-03 NOTE — PROGRESS NOTE ADULT - SUBJECTIVE AND OBJECTIVE BOX
Ortho Post Op Check    Procedure: C5-C7 ACDF ICBG  Surgeon: Apolinar    Pt comfortable lying in bed. Pt states that her neck and shoulder feels heavy, but overall her pain is decently controlled.   Denies CP, SOB, N/V, numbness/tingling     Vital Signs Last 24 Hrs  T(C): 36.8 (01-03-25 @ 15:04), Max: 36.8 (01-03-25 @ 15:04)  T(F): 98.2 (01-03-25 @ 15:04), Max: 98.2 (01-03-25 @ 15:04)  HR: 100 (01-03-25 @ 15:27) (100 - 110)  BP: 181/84 (01-03-25 @ 15:19) (179/81 - 184/89)  BP(mean): 121 (01-03-25 @ 15:19) (121 - 127)  RR: 21 (01-03-25 @ 15:27) (15 - 21)  SpO2: 96% (01-03-25 @ 15:27) (95% - 97%)  I&O's Summary      General: Pt Alert and oriented, NAD  DSG C/D/I - prineo  Pulses: 2+ radial. Skin warm, dry, well perfused b/l  Sensation: SILT C4-C8  Motor: AIN/PIN/ulnar n. 5/5  Bicep/tricep/ 5/5 b/l                A/P: 74yFemale POD#0 s/p C5-C7 ACDF ICBG  - Stable  - Pain Control  - DVT ppx: SCDs  - Post op abx: ancef  - PT, WBS: WBAT  - Dispo: Pending OT/PT eval    Ortho Pager [phone]   Ortho Post Op Check    Procedure: C5-C7 ACDF ICBG  Surgeon: Apolinar    Pt comfortable lying in bed. Pt states that her neck and shoulder feels heavy, but overall her pain is decently controlled.   Denies CP, SOB, N/V, numbness/tingling     Vital Signs Last 24 Hrs  T(C): 36.8 (01-03-25 @ 15:04), Max: 36.8 (01-03-25 @ 15:04)  T(F): 98.2 (01-03-25 @ 15:04), Max: 98.2 (01-03-25 @ 15:04)  HR: 100 (01-03-25 @ 15:27) (100 - 110)  BP: 181/84 (01-03-25 @ 15:19) (179/81 - 184/89)  BP(mean): 121 (01-03-25 @ 15:19) (121 - 127)  RR: 21 (01-03-25 @ 15:27) (15 - 21)  SpO2: 96% (01-03-25 @ 15:27) (95% - 97%)  I&O's Summary      General: Pt Alert and oriented, NAD  DSG C/D/I - prineo. HV x1 holding suction  Pulses: 2+ radial. Skin warm, dry, well perfused b/l  Sensation: SILT C4-C8  Motor: AIN/PIN/ulnar n. 5/5  Bicep/tricep/ 5/5 strength b/l                A/P: 74yFemale POD#0 s/p C5-C7 ACDF with ICBG  - Stable  - Pain Control  - DVT ppx: SCDs  - Post op abx: ancef  - HV x1 remaining in place  - PT, WBS: WBAT  - Dispo: Pending OT/PT eval    Ortho Pager [phone]

## 2025-01-03 NOTE — PRE-ANESTHESIA EVALUATION ADULT - NSANTHOSAYNRD_GEN_A_CORE
No. ELSI screening performed.  STOP BANG Legend: 0-2 = LOW Risk; 3-4 = INTERMEDIATE Risk; 5-8 = HIGH Risk

## 2025-01-03 NOTE — PRE-OP CHECKLIST - PATIENT'S PERSONAL PROPERTY GIVEN TO
clothes on unit, valuables given to security/on unit/security/safe clothes and cane on unit, valuables given to security/on unit/security/safe

## 2025-01-03 NOTE — PRE-ANESTHESIA EVALUATION ADULT - NSANTHPMHFT_GEN_ALL_CORE
75yo F with HTN, HL, asthma, mild COPD, breast cancer, GERD, osteoporosis 75yo F with HTN, HL, asthma, mild COPD, breast cancer, GERD, osteoporosis., lumbar and cervical radiculopathy c/w pain, tingling and numbess. She walks with cane and able to climb stairs if she has to but slowly. She denies cardiac history or testing in the past.

## 2025-01-04 ENCOUNTER — TRANSCRIPTION ENCOUNTER (OUTPATIENT)
Age: 75
End: 2025-01-04

## 2025-01-04 LAB
ANION GAP SERPL CALC-SCNC: 11 MMOL/L — SIGNIFICANT CHANGE UP (ref 5–17)
BASOPHILS # BLD AUTO: 0.01 K/UL — SIGNIFICANT CHANGE UP (ref 0–0.2)
BASOPHILS NFR BLD AUTO: 0.1 % — SIGNIFICANT CHANGE UP (ref 0–2)
BUN SERPL-MCNC: 12 MG/DL — SIGNIFICANT CHANGE UP (ref 7–23)
CALCIUM SERPL-MCNC: 9 MG/DL — SIGNIFICANT CHANGE UP (ref 8.4–10.5)
CHLORIDE SERPL-SCNC: 98 MMOL/L — SIGNIFICANT CHANGE UP (ref 96–108)
CO2 SERPL-SCNC: 25 MMOL/L — SIGNIFICANT CHANGE UP (ref 22–31)
CREAT SERPL-MCNC: 0.77 MG/DL — SIGNIFICANT CHANGE UP (ref 0.5–1.3)
EGFR: 81 ML/MIN/1.73M2 — SIGNIFICANT CHANGE UP
EOSINOPHIL # BLD AUTO: 0 K/UL — SIGNIFICANT CHANGE UP (ref 0–0.5)
EOSINOPHIL NFR BLD AUTO: 0 % — SIGNIFICANT CHANGE UP (ref 0–6)
GLUCOSE SERPL-MCNC: 132 MG/DL — HIGH (ref 70–99)
HCT VFR BLD CALC: 40.9 % — SIGNIFICANT CHANGE UP (ref 34.5–45)
HGB BLD-MCNC: 13.2 G/DL — SIGNIFICANT CHANGE UP (ref 11.5–15.5)
IMM GRANULOCYTES NFR BLD AUTO: 0.3 % — SIGNIFICANT CHANGE UP (ref 0–0.9)
LYMPHOCYTES # BLD AUTO: 1.23 K/UL — SIGNIFICANT CHANGE UP (ref 1–3.3)
LYMPHOCYTES # BLD AUTO: 8.9 % — LOW (ref 13–44)
MCHC RBC-ENTMCNC: 27 PG — SIGNIFICANT CHANGE UP (ref 27–34)
MCHC RBC-ENTMCNC: 32.3 G/DL — SIGNIFICANT CHANGE UP (ref 32–36)
MCV RBC AUTO: 83.8 FL — SIGNIFICANT CHANGE UP (ref 80–100)
MONOCYTES # BLD AUTO: 0.4 K/UL — SIGNIFICANT CHANGE UP (ref 0–0.9)
MONOCYTES NFR BLD AUTO: 2.9 % — SIGNIFICANT CHANGE UP (ref 2–14)
NEUTROPHILS # BLD AUTO: 12.15 K/UL — HIGH (ref 1.8–7.4)
NEUTROPHILS NFR BLD AUTO: 87.8 % — HIGH (ref 43–77)
NRBC # BLD: 0 /100 WBCS — SIGNIFICANT CHANGE UP (ref 0–0)
PLATELET # BLD AUTO: 256 K/UL — SIGNIFICANT CHANGE UP (ref 150–400)
POTASSIUM SERPL-MCNC: 4.2 MMOL/L — SIGNIFICANT CHANGE UP (ref 3.5–5.3)
POTASSIUM SERPL-SCNC: 4.2 MMOL/L — SIGNIFICANT CHANGE UP (ref 3.5–5.3)
RBC # BLD: 4.88 M/UL — SIGNIFICANT CHANGE UP (ref 3.8–5.2)
RBC # FLD: 14.4 % — SIGNIFICANT CHANGE UP (ref 10.3–14.5)
SODIUM SERPL-SCNC: 134 MMOL/L — LOW (ref 135–145)
WBC # BLD: 13.83 K/UL — HIGH (ref 3.8–10.5)
WBC # FLD AUTO: 13.83 K/UL — HIGH (ref 3.8–10.5)

## 2025-01-04 PROCEDURE — 99222 1ST HOSP IP/OBS MODERATE 55: CPT

## 2025-01-04 RX ORDER — ACETAMINOPHEN/CHLORPHENIRAMINE 325MG-2MG
1 TABLET ORAL
Refills: 0 | DISCHARGE

## 2025-01-04 RX ORDER — ACETAMINOPHEN 80 MG/.8ML
2 SOLUTION/ DROPS ORAL
Qty: 0 | Refills: 0 | DISCHARGE
Start: 2025-01-04

## 2025-01-04 RX ORDER — POLYETHYLENE GLYCOL 3350 17 G/DOSE
17 POWDER (GRAM) ORAL
Qty: 0 | Refills: 0 | DISCHARGE
Start: 2025-01-04

## 2025-01-04 RX ORDER — OXYCODONE HCL 15 MG
1 TABLET ORAL
Qty: 20 | Refills: 0
Start: 2025-01-04

## 2025-01-04 RX ADMIN — ACETAMINOPHEN 1000 MILLIGRAM(S): 80 SOLUTION/ DROPS ORAL at 21:59

## 2025-01-04 RX ADMIN — Medication 6 MILLIGRAM(S): at 11:38

## 2025-01-04 RX ADMIN — ONDANSETRON 4 MILLIGRAM(S): 4 TABLET ORAL at 11:37

## 2025-01-04 RX ADMIN — Medication 5 MILLIGRAM(S): at 17:15

## 2025-01-04 RX ADMIN — SODIUM CHLORIDE 100 MILLILITER(S): 9 INJECTION, SOLUTION INTRAVENOUS at 04:24

## 2025-01-04 RX ADMIN — Medication 10 MILLIGRAM(S): at 06:15

## 2025-01-04 RX ADMIN — Medication 100 MILLIGRAM(S): at 04:16

## 2025-01-04 RX ADMIN — ACETAMINOPHEN 1000 MILLIGRAM(S): 80 SOLUTION/ DROPS ORAL at 06:15

## 2025-01-04 RX ADMIN — Medication 5 MILLIGRAM(S): at 18:15

## 2025-01-04 RX ADMIN — GABAPENTIN 100 MILLIGRAM(S): 300 CAPSULE ORAL at 21:58

## 2025-01-04 RX ADMIN — GABAPENTIN 100 MILLIGRAM(S): 300 CAPSULE ORAL at 14:24

## 2025-01-04 RX ADMIN — PANTOPRAZOLE 40 MILLIGRAM(S): 40 TABLET, DELAYED RELEASE ORAL at 06:16

## 2025-01-04 RX ADMIN — Medication 6 MILLIGRAM(S): at 06:15

## 2025-01-04 RX ADMIN — Medication 17 GRAM(S): at 11:38

## 2025-01-04 RX ADMIN — SENNOSIDES 2 TABLET(S): 8.6 TABLET, FILM COATED ORAL at 21:58

## 2025-01-04 RX ADMIN — ACETAMINOPHEN 1000 MILLIGRAM(S): 80 SOLUTION/ DROPS ORAL at 14:24

## 2025-01-04 RX ADMIN — GABAPENTIN 100 MILLIGRAM(S): 300 CAPSULE ORAL at 06:22

## 2025-01-04 RX ADMIN — BENZOCAINE AND MENTHOL 1 LOZENGE: 15; 3.6 LOZENGE ORAL at 17:12

## 2025-01-04 NOTE — PATIENT PROFILE ADULT - FALL HARM RISK - RISK INTERVENTIONS

## 2025-01-04 NOTE — DISCHARGE NOTE PROVIDER - NSDCFUADDINST_GEN_ALL_CORE_FT
s/p: C5-7 ACDF with ICBG    ACTIVITY:   - No extreme bending, extending, turning, twisting, or straining. No strenuous activity, heavy lifting, driving or returning to work until cleared by your surgeon.   - wear cervical collar as directed until you follow up with Dr. Jiang    DRESSING/SHOWERING:    (PRINEO – mesh with glue) - covered with telfa/tegaderm   -May shower post-op day 1, pat dry afterwards. Dressing is water-resistant. Do not soak in bathtubs. Do not need to cover with another dressing. Do not remove mesh dressing – it will be taken care of in your follow up appointment. Do not apply ointments, creams or oils to incision area.   Your drain was removed prior to discharge and covered with dry dressing. You may remove dressing in 1-2 days and leave open to air       MEDICATION/ANTICOAGULATION:   - You have been prescribed medications for pain:   - Tylenol (Acetaminophen) for mild to moderate pain. Do not exceed 3,000mg daily.   - For more severe pain, you may continue to take the Tylenol with the addition of narcotic pain medication. Take this medication as prescribed. Do not take more than prescribed. Note that this medication may cause drowsiness or dizziness. Do not operate machinery. This medication may cause constipation.   - If you have been prescribed a muscle relaxer, take this medication as needed for muscle spasm. Follow instructions on bottle.     - Try to have regular bowel movements. Take stool softener or laxative if necessary. You may wish to take Miralax daily until you have regular bowel movements.    - Do not take antiinflammatories (Aleve, Advil, Naproxen, Ibuprofen, etc.) until cleared by your surgeon. Tylenol is not an anti-inflammatory and okay to take (see above).   - If you have a pain management physician, please follow-up with them postoperatively.    - If you experience any negative side effects of your medications, please call your surgeon's office to discuss.     FOLLOW UP:   - Call to schedule an appt with Dr. Jiang for follow up.    - Please follow-up with your primary care physician or any other specialist you see postoperatively, if needed.    - Contact your doctor or go to the emergency room if you experience: fever greater than 101.5, chills, chest pain, difficulty breathing, redness or excessive drainage around the incision, other concerns. s/p: C5-7 ACDF with ICBG    ACTIVITY:   - No extreme bending, extending, turning, twisting, or straining. No strenuous activity, heavy lifting, driving or returning to work until cleared by your surgeon.   - wear cervical collar as directed until you follow up with Dr. Jiang    DRESSING/SHOWERING:    (PRINEO – mesh with glue) - covered with telfa/tegaderm   -May shower post-op day 1, pat dry afterwards. Dressing is water-resistant. Do not soak in bathtubs. Do not need to cover with another dressing. Do not remove mesh dressing – it will be taken care of in your follow up appointment. Do not apply ointments, creams or oils to incision area.   Your drain was removed prior to discharge and covered with dry dressing. You may remove dressing in 1-2 days and leave open to air       MEDICATION/ANTICOAGULATION:   - You have been prescribed medications for pain:   - Tylenol (Acetaminophen) for mild to moderate pain. Do not exceed 3,000mg daily.   - For more severe pain, you may continue to take the Tylenol with the addition of narcotic pain medication. Take this medication as prescribed. Do not take more than prescribed. Note that this medication may cause drowsiness or dizziness. Do not operate machinery. This medication may cause constipation.   - If you have been prescribed a muscle relaxer, take this medication as needed for muscle spasm. Follow instructions on bottle.     - Try to have regular bowel movements. Take stool softener or laxative if necessary. You may wish to take Miralax daily until you have regular bowel movements.    - Do not take antiinflammatories (Aleve, Advil, Naproxen, Ibuprofen, etc.) until cleared by your surgeon. Tylenol is not an anti-inflammatory and okay to take (see above).   - If you have a pain management physician, please follow-up with them postoperatively.    - If you experience any negative side effects of your medications, please call your surgeon's office to discuss.     FOLLOW UP:   - Call to schedule an appt with Dr. Jiang for follow up.    - Please follow-up with your primary care physician or any other specialist you see postoperatively, if needed.    - Contact your doctor or go to the emergency room if you experience: fever greater than 101.5, chills, chest pain, difficulty breathing, redness or excessive drainage around the incision, other concerns.      Patient treated for presumed COPD exacerbation on 1/5. She was started on a 3 day course of azithromycin and a 5 day course of prednisone. She is optimized for discharge as her symptoms are improving however she will need to follow up with her PCP within one week of discharge. PCP (Dr. Vega) called and notified. They will call the patient this week to arrange follow up appointment. Resume home inhalers on discharge.
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
## 2025-01-04 NOTE — DISCHARGE NOTE PROVIDER - NSDCMRMEDTOKEN_GEN_ALL_CORE_FT
acetaminophen 500 mg oral tablet: 2 tab(s) orally every 8 hours  alendronate 70 mg oral tablet: 1 tab(s) orally every 7 days  amLODIPine 10 mg oral tablet: 1 tab(s) orally once a day  gabapentin 100 mg oral tablet: 1 tab(s) orally 3 times a day  Miebo ophthalmic solution: 1 drop(s) in each affected eye 4 times a day  omeprazole 20 mg oral delayed release capsule: 1 cap(s) orally once a day  oxyCODONE 5 mg oral tablet: 1 tab(s) orally every 4 to 6 hours as needed for Severe Pain (7 - 10) MDD: 4  polyethylene glycol 3350 oral powder for reconstitution: 17 gram(s) orally once a day as needed for  constipation  promethazine 25 mg/5 mL oral syrup: orally prn  tiZANidine 2 mg oral tablet: 2 tab(s) orally 3 times a day as needed for  muscle spasm prn  Trelegy Ellipta 100 mcg-62.5 mcg-25 mcg/inh inhalation powder: 1 inhaled prn  valACYclovir 500 mg oral tablet: 1 tab(s) orally once a day as needed for PRN prn  Xiidra 5% ophthalmic solution: 1 drop(s) in each eye 2 times a day   acetaminophen 500 mg oral tablet: 2 tab(s) orally every 8 hours  alendronate 70 mg oral tablet: 1 tab(s) orally every 7 days  amLODIPine 10 mg oral tablet: 1 tab(s) orally once a day  azithromycin 500 mg oral tablet: 1 tab(s) orally once a day MDD: 1  gabapentin 100 mg oral tablet: 1 tab(s) orally 3 times a day  Miebo ophthalmic solution: 1 drop(s) in each affected eye 4 times a day  omeprazole 20 mg oral delayed release capsule: 1 cap(s) orally once a day  oxyCODONE 5 mg oral tablet: 1 tab(s) orally every 4 to 6 hours as needed for Severe Pain (7 - 10) MDD: 4  polyethylene glycol 3350 oral powder for reconstitution: 17 gram(s) orally once a day as needed for  constipation  predniSONE 20 mg oral tablet: 2 tab(s) orally once a day MDD: 2  promethazine 25 mg/5 mL oral syrup: orally prn  tiZANidine 2 mg oral tablet: 2 tab(s) orally 3 times a day as needed for  muscle spasm prn  Trelegy Ellipta 100 mcg-62.5 mcg-25 mcg/inh inhalation powder: 1 inhaled prn  valACYclovir 500 mg oral tablet: 1 tab(s) orally once a day as needed for PRN prn  Xiidra 5% ophthalmic solution: 1 drop(s) in each eye 2 times a day

## 2025-01-04 NOTE — OCCUPATIONAL THERAPY INITIAL EVALUATION ADULT - MANUAL MUSCLE TESTING RESULTS, REHAB EVAL
B UE and B LE >3+/5 upon functional assessment against gravity./no strength deficits were identified

## 2025-01-04 NOTE — CONSULT NOTE ADULT - ASSESSMENT
74F PMH remote L breast CA (sp chemo and RT 2005), COPD, GERD, HTN, osteoporosis, sciatica presenting with worsening neck pain found to have cervical stenosis sp procedure.    #cervical stenosis sp procedure 1/3  - plan per ortho    #HTN  - cw home amlodipine 10, sp IV hydral 5 once yesterday PM post OR  - pressures currently better, mostly 140s systolic    #COPD  - cw home spiriva and advair, no s/s of COPD exacerbation, sating well on RA    #GERD  - pantoprazole, at home on omeprazole    #osteoporosis   - on alendronate q7d    #sciatica  - cw home gabapentin, robaxin PRN

## 2025-01-04 NOTE — PATIENT PROFILE ADULT - ARRIVAL FROM
Ms. Seema Saucedo is a 67 y.o. y/o female with history of Afib who presents today for anticoagulation monitoring and adjustment.   INR 2.9 is therapeutic for this patient (goal range 2-3) and is reflective of 22.5 mg TWD  Patient verifies current dosing regimen, patient able to verbally recall dose  Patient reports no  missed doses since last INR   Patient denies s/sx clotting and/or stroke  Patient denies hematuria, epistaxis, rectal bleeding  Patient denies changes in diet, alcohol, or tobacco use  Reviewed medication list and drug allergies with patient, updated any medication additions or modifications accordingly  Patient also denies any pending medical or dental procedures scheduled at this time  Patient was instructed to continue current regimen of 22.5 mg TWD and RTC 6 weeks    Aura Britt, PharmD  Staff Pharmacist  4/4/2019 1:03 PM
Home

## 2025-01-04 NOTE — PROGRESS NOTE ADULT - SUBJECTIVE AND OBJECTIVE BOX
Ortho Note    Pt comfortable without complaints, pain controlled  Denies CP, SOB, N/V, numbness/tingling     Vital Signs Last 24 Hrs  T(C): 36.9 (01-04-25 @ 09:24), Max: 36.9 (01-04-25 @ 09:24)  T(F): 98.4 (01-04-25 @ 09:24), Max: 98.4 (01-04-25 @ 09:24)  HR: 97 (01-04-25 @ 09:24) (74 - 97)  BP: 160/73 (01-04-25 @ 09:24) (149/87 - 160/73)  BP(mean): --  RR: 19 (01-04-25 @ 09:24) (18 - 19)  SpO2: 96% (01-04-25 @ 09:24) (94% - 96%)  I&O's Summary    03 Jan 2025 07:01  -  04 Jan 2025 07:00  --------------------------------------------------------  IN: 1600 mL / OUT: 205 mL / NET: 1395 mL    04 Jan 2025 07:01  -  04 Jan 2025 12:05  --------------------------------------------------------  IN: 200 mL / OUT: 400 mL / NET: -200 mL        General: Pt Alert and oriented, NAD  DSG C/D/I - prineo  Pulses: 2+ radial  Bilat UE  SILT  Motors   C5 (deltoid abduction) 5/5     C6 (biceps flexion)  5/5     C7 (triceps extension) 5/5     C8 (finger flexion)  5/5     T1 (interosseous) 5/5                            13.2   13.83 )-----------( 256      ( 04 Jan 2025 07:28 )             40.9     01-04    134[L]  |  98  |  12  ----------------------------<  132[H]  4.2   |  25  |  0.77    Ca    9.0      04 Jan 2025 07:28        A/P: 74yFemale s/p C5-C7 ACDF with ICBG   - Stable  - Pain Control  -PT progress:  Pending OT eval   -Drains:  d/c today  -DVT ppx - SCDs  Dispo: Pending OT george  [facility] Pager [phone]

## 2025-01-04 NOTE — DISCHARGE NOTE PROVIDER - CARE PROVIDERS DIRECT ADDRESSES
[facility] Hospitalist History and Physical      PCP: Syed Mohsin, MD  Notified via Epic/PS if available    Chief Complaint:     Chief Complaint   Patient presents with   • Altered Mental Status       History Of Present Illness:   80-year-old male PMH HTN, HLD, A. fib on Eliquis, CVA with right-sided deficits, dementia baseline ANO to self and place presenting to [facility] ED for weakness and altered mental status.  Patient was found to have septic shock secondary to acute cholecystitis.  Patient was taken to IR for percutaneous cholecystostomy.  He was admitted to the MICU and given IV antibiotics.  Patient's mental status improved and he was weaned off pressors.  On my evaluation patient was following commands.  He denied any pain or issues.  He was alert to self, but not place or time.      14 point Review of Systems is negative except for as noted above.      Past Medical History  Past Medical History:   Diagnosis Date   • Atrial fibrillation (CMS/HCC)    • Chronic kidney disease    • Essential (primary) hypertension        Surgical History  No past surgical history on file.     Social History  Social History     Tobacco Use   • Smoking status: Light Tobacco Smoker     Types: Cigarettes     Start date: 1981   • Smokeless tobacco: Never Used   Vaping Use   • Vaping Use: never used   Substance Use Topics   • Alcohol use: Not Currently   • Drug use: Never     Patient denies other alcohol, tobacco, or illicit drug use except for as noted above.    Family History  No family history on file.  Patient denies/does not have knowledge / is not aware / of any other chronic conditions in mother father or direct siblings.    Allergies  ALLERGIES:  Patient has no known allergies.  Patient denies/does not have knowledge of any other allergies    Home Medications  Medications Prior to Admission   Medication Sig Dispense Refill   • traMADol (ULTRAM) 50 MG tablet Take 50 mg by mouth every 8 hours as needed for Pain.     • cholecalciferol  (VITAMIN D) 25 mcg (1,000 units) tablet Take 25 mcg by mouth 1 day a week. Wednesday     • aspirin 81 MG EC tablet Take 1 tablet by mouth daily. Do not start before January 17, 2022. 30 tablet 1   • QUEtiapine (SEROquel) 25 MG tablet Take 1 tablet by mouth daily as needed (for agitation/agressive behavior). 30 tablet 0   • memantine (NAMENDA) 5 MG tablet Take 1 tablet by mouth every 12 hours. 60 tablet 1   • amLODIPine (Norvasc) 10 MG tablet Take 1 tablet by mouth daily. 30 tablet 1   • losartan (COZAAR) 100 MG tablet Take 100 mg by mouth daily.     • senna (SENOKOT) 8.6 MG tablet Take 1 tablet by mouth daily.     • traZODone (DESYREL) 100 MG tablet Take 100 mg by mouth nightly.     • magnesium hydroxide (MILK OF MAGNESIA) 800 MG/5ML suspension Take 30 mLs by mouth daily as needed for Constipation.     • acetaminophen (TYLENOL) 160 MG/5ML solution Take 650 mg by mouth every 4 hours as needed for Fever.     • apixaBAN (ELIQUIS) 5 MG Tab Take 1 tablet by mouth every 12 hours. 60 tablet 0   • atorvastatin (LIPITOR) 40 MG tablet Take 1 tablet by mouth nightly. 30 tablet 0   • bisacodyl (DULCOLAX) 5 MG EC tablet Take 2 tablets by mouth daily as needed for Constipation. 30 tablet 0   • carvedilol (COREG) 12.5 MG tablet Take 1 tablet by mouth every 12 hours. 60 tablet 0   • polyethylene glycol (MIRALAX) 17 g packet Take 17 g by mouth every other day. Do not start before October 26, 2021. Stir and dissolve powder in any 4 to 8 ounces of beverage, then drink. 15 each 0     Reviewed with patient.     Inpatient Medications  Current Facility-Administered Medications   Medication Dose Route Frequency Provider Last Rate Last Admin   • memantine (NAMENDA) tablet 5 mg  5 mg Oral 2 times per day Berry Cam MD   5 mg at 05/13/22 1116   • QUEtiapine (SEROquel) tablet 25 mg  25 mg Oral Daily PRN Berry Cam MD       • sodium chloride 0.9 % flush bag 25 mL  25 mL Intravenous PRN Berry Cam MD       • sodium  chloride (PF) 0.9 % injection 2 mL  2 mL Intracatheter 2 times per day Berry Cam MD   2 mL at 05/13/22 0829   • sodium chloride 0.9% infusion   Intravenous Continuous PRN Berry Cam MD       • sodium chloride 0.9% infusion   Intravenous Continuous PRN Berry Cam MD       • cefTRIAXone (ROCEPHIN) syringe 2,000 mg  2,000 mg Intravenous Daily Berry Cam MD   2,000 mg at 05/12/22 2230   • metroNIDAZOLE (FLAGYL) IVPB 500 mg  500 mg Intravenous 3 times per day Berry Cam  mL/hr at 05/13/22 1430 500 mg at 05/13/22 1430   • lactated ringers infusion   Intravenous Continuous Franck NESSA [place],  mL/hr at 05/12/22 0946 New Bag at 05/12/22 0946   • heparin (porcine) 25,000 units/250 mL in dextrose 5 % infusion  1-30 Units/kg/hr (Dosing Weight) Intravenous Continuous Rossana Jeong MD 8 mL/hr at 05/13/22 1356 10 Units/kg/hr at 05/13/22 1356   • heparin (porcine) injection 4,000 Units  4,000 Units Intravenous PRN Rossana Jeong MD   4,000 Units at 05/12/22 2108   • heparin (porcine) injection 2,000 Units  2,000 Units Intravenous PRN Rossana Jeong MD       • NORepinephrine (LEVOPHED) 8 mg/250 mL in sodium chloride 0.9 % infusion  0-30 mcg/min Intravenous Continuous Berry Cam MD 5.63 mL/hr at 05/11/22 2255 3 mcg/min at 05/11/22 2255     All inpatient medications, side effects and potential interactions discussed.    In/Out    Intake/Output Summary (Last 24 hours) at 5/13/2022 1459  Last data filed at 5/13/2022 0600  Gross per 24 hour   Intake --   Output 1020 ml   Net -1020 ml        Physical Exam  Visit Vitals  /67   Pulse (!) 52   Temp 97.9 °F (36.6 °C) (Axillary)   Resp 12   Ht 6' 2\" (1.88 m)   Wt 79.8 kg (175 lb 14.8 oz)   SpO2 (!) 81%   BMI 22.59 kg/m²       Physical Exam:  General: Alert and oriented to self only, no acute distress  Eyes: no scleral icterus, no conjunctival erythema   Cardio: S1, S2, RRR, no murmur, rub, gallop or  thrills noted.   Pulm: Lungs clear to auscultation bilaterally, no wheeze or rhonchi noted. No chest wall tenderness  GI: Soft, non-tender, nondistended. Normal bowel sounds auscultated x4 quadrants. Perc nidia tube with bloody output  : No suprapubic Tenderness, no CVA tenderness bilaterally  Ext: No upper or lower extremity edema noted. No cords palpated.   Musculoskeletal: 5/5 strength both upper and lower extremities. No joint tenderness or erythema.  Skin: No abnormal bruising or discoloration noted. No jaundice.   Psych: Appropriate mood and affect. Poor Insight and Judgment  Neuro: No focal motor or sensory deficits noted. Pt appropriately follows commands.    Labs   Recent Labs   Lab 05/13/22  1401 05/12/22  0423 05/11/22  2106 05/11/22 2056   SODIUM 137 136  --  135   POTASSIUM 3.3* 4.6  --  4.4   CHLORIDE 102 106  --  104   CO2 28 25  --  26   BUN 32* 47*  --  48*   CREATININE 1.60* 2.23*  --  2.49*   GLUCOSE 123* 119*  --  155*   ALBUMIN 3.1* 2.9*  --  2.6*   PHOS  --  3.3  --   --    AST 39* 31  --  37   BILIRUBIN 0.4 0.4  --  0.5   TSH  --   --  1.464  --        Imaging  IR CHOLECYSTOSTOMY   Final Result      Placement of a percutaneous cholecystostomy under ultrasound and   fluoroscopic guidance as described above.       PLAN:      Routine exchange in 6 weeks unless surgical intervention is planned before   that time.      Electronically Signed by: WILLARD COFFMAN MD    Signed on: 5/12/2022 10:04 AM          CT HEAD WO CONTRAST   Final Result      Severely motion degraded exam.  No acute intracranial hemorrhage, new large   territorial infarct, or mass effect.      Electronically Signed by: MEL SANDS M.D.    Signed on: 5/11/2022 9:52 PM          CT CHEST ABDOMEN PELVIS WO CONTRAST   Final Result      1.   Secretions in the tracheobronchial tree representing aspirated   material. Mild linear opacities in the left lower lobe likely represent   mild atelectasis or aspiration.   2.   Dilated  pulmonary artery, highly suggestive of pulmonary hypertension.   3.   Cholelithiasis with extensive gallbladder wall thickening,   pericholecystic edema and surrounding inflammatory changes, consistent with   acute cholecystitis.   4.   Hepatic cirrhosis without definite focal hepatic lesion.      Electronically Signed by: OTTONIEL MOLINA MD    Signed on: 5/11/2022 9:41 PM          XR CHEST PA OR AP 1 VIEW   Final Result   FINDINGS/IMPRESSION:      Lung fields are clear of any significant abnormality. No focal   consolidation.      Cardiomediastinal silhouette is stable. Pulmonary vasculature within normal   limits.      No pneumothorax. No pleural effusion.      No acute osseous abnormality.            Electronically Signed by: MEL SANDS M.D.    Signed on: 5/11/2022 9:28 PM          IR CHOLECYSTOSTOMY    (Results Pending)       Microbiology Results  (Last 10 results in the past 7 days)    Specimen   Gram Smear   Culture Result   Status       05/12/22  0211         No organisms seen.  [P]            Many Polymorphonuclear cells.  [P]                 [P] - Preliminary Result             I personally reviewed the patient's imaging, radiology and report(s).     LAST ECHO/ECHO STRESS:  No valid procedures specified.    Echocardiogram Results Personally reviewed by me.     Assessment/Plan:  All the following conditions PRESENT ON ADMISSION.   * Acute cholecystitis  Assessment & Plan  - CT with gallbladder wall thickening and fat stranding  - s/p IR perc nidia drain 5/12  - gen surgery following, no plans for cholecystectomy this admissio; f/u outpatient  - continue ceftriaxone and flagyl  - home health ordered    Septic shock (CMS/HCC)  Assessment & Plan  - POA, 2/2 acute cholecystitis  - transferred out of MICU  - resolved    History of cardioembolic cerebrovascular accident (CVA)  Assessment & Plan  - R sided deficits  - PT/ST    Primary hypertension  Assessment & Plan  - hold home meds  - restart as  able    Dementia (CMS/[facility])  Assessment & Plan  - continue home memantine  - baseline oriented to self and place, intermittently time    Atrial fibrillation (CMS/[facility])  Assessment & Plan  - on elqiuis at home  - cont hep gtt, will transition prior to dc               DVT Prophylaxis:    Current Active Medications for DVT Prophylaxis (From admission, onward)         Stop     heparin (porcine) 25,000 units/250 mL in dextrose 5 % infusion  1-30 Units/kg/hr (Dosing Weight),   Intravenous,   CONTINUOUS         --               Diet:   Dietary Orders (From admission, onward)             Start     Ordered    05/13/22 0847  Cholesterol Low Saturated Fat Low, Sodium 2 gm (Low Sodium) Diet  DIET EFFECTIVE NOW        Question Answer Comment   Diet Modifiers Cholesterol Low Saturated Fat Low    Diet Modifiers Sodium 2 gm (Low Sodium)        05/13/22 0847    05/12/22 1014  2 Times/Day w Breakfast & Dinner; Ensure Enlive/Standard Oral Supplement, Vanilla (once diet is ordered) Oral Nutrition Supplement  As Directed        Question Answer Comment   Frequency 2 Times/Day w Breakfast & Dinner    Oral Supplement Ensure Enlive/Standard Oral Supplement, Vanilla once diet is ordered       05/12/22 1014               Baseline Activity: Ambulates Independently      Advance Care Planing  I have evaluated the patient who has the capacity to make healthcare decisions.  Discussed meaning of DNR/DNI status vs Full Code status as well as plan for advanced directive vs. Surrogate and what this entails.   Discussed CPR, intubation, electrical cardioversion, vasopressors and antiarrhythmics.   Also made clear that code status can change at any time. Patient can change his/her mind any time.    All questions answered.  Advanced care planning care time 16 minutes      CODE STATUS: FULL CODE  Code Status    Code Status: Full Resuscitation    Physician Notification:  Consultants notified of patient via [employer].  Communication: with patient, nurse,  ER physician / Resident    Please note that more than 70 minutes was spent to complete this H&P, more than 50% of this time was spent coordinating patient care.  This includes endering the following: Reviewed all vitals, medications, new orders, I/O, labs, micro, radiology, nurses notes, pertinent consultant notes which are reflected in assessment and plan.This does not include time spent on other items of care such as smoking cessation counseling, prolonged care time, and or advanced care planning if applicable.     Due to the development of diagnoses during acute hospital stay, H&P and progress notes will not be shared in real time with the patient to ensure that proper explanations and reviews can be done with the patient. Pre-mature information may sometimes be harmful to the patient if not relayed in a compassionate and well timed manner, and most often are best relayed by the physician. No information is withheld from the patient or family, and all documentation may be requested after patient is discharged once diagnoses and conditions have been confirmed. All discharge summaries with final diagnoses and workups as well as follow ups will be available to the patient via [employer] unless otherwise appropriate.       Patricia Boston MD  [facility] [place] [facility]  5/13/2022 2:59 PM     [email or web address]

## 2025-01-04 NOTE — PROGRESS NOTE ADULT - SUBJECTIVE AND OBJECTIVE BOX
Ortho Note    Pt comfortable without complaints, pain controlled  Denies CP, SOB, N/V, numbness/tingling     Vital Signs Last 24 Hrs  T(C): 36.9 (01-04-25 @ 09:24), Max: 36.9 (01-04-25 @ 09:24)  T(F): 98.4 (01-04-25 @ 09:24), Max: 98.4 (01-04-25 @ 09:24)  HR: 97 (01-04-25 @ 09:24) (74 - 97)  BP: 160/73 (01-04-25 @ 09:24) (149/87 - 160/73)  BP(mean): --  RR: 19 (01-04-25 @ 09:24) (18 - 19)  SpO2: 96% (01-04-25 @ 09:24) (94% - 96%)  I&O's Summary    03 Jan 2025 07:01  -  04 Jan 2025 07:00  --------------------------------------------------------  IN: 1600 mL / OUT: 205 mL / NET: 1395 mL    04 Jan 2025 07:01  -  04 Jan 2025 11:58  --------------------------------------------------------  IN: 200 mL / OUT: 400 mL / NET: -200 mL        General: Pt Alert and oriented, NAD  DSG C/D/I - prieno  Pulses: 2+ radial  Sensation:  Motor: Quad/Ham/EHL/FHL/TA/GS                          13.2   13.83 )-----------( 256      ( 04 Jan 2025 07:28 )             40.9     01-04    134[L]  |  98  |  12  ----------------------------<  132[H]  4.2   |  25  |  0.77    Ca    9.0      04 Jan 2025 07:28        A/P: 74yFemale POD# s/p   - Stable  - Pain Control  - DVT ppx:  - PT, WBS:     Ortho Pager [phone]

## 2025-01-04 NOTE — DISCHARGE NOTE PROVIDER - CARE PROVIDER_API CALL
Hamlet Jiang Dev  Spine Surgery  [address] [place] [address], [address]  [place] [address]  Phone: [phone]  Fax: [phone]  Follow Up Time: 2 weeks

## 2025-01-04 NOTE — OCCUPATIONAL THERAPY INITIAL EVALUATION ADULT - DIAGNOSIS, OT EVAL
Pt POD1 admitted for anterior cervical discectomy and fusion C5-C7, presents with impaired balance, decreased strength and activity tolerance.

## 2025-01-04 NOTE — DISCHARGE NOTE PROVIDER - HOSPITAL COURSE
Admitted on 1/3/25  Attending: Dr. Jiang  Surgery: ACDF C5-7 with illiac crest bone graft  Jovana-op Antibiotics  Pain control  DVT prophylaxis  OOB/Physical Therapy/Occupational Therapy   Medicine Consult       Admitted on 1/3/25  Attending: Dr. Jiang  Surgery: ACDF C5-7 with illiac crest bone graft  Jovana-op Antibiotics  Pain control  DVT prophylaxis  OOB/Physical Therapy/Occupational Therapy   Medicine Consult    Patient to be evaluated at home by her [facility] agency for additional hours     Admitted on 1/3/25  Attending: Dr. Jiang  Surgery: ACDF C5-7 with illiac crest bone graft  Jovana-op Antibiotics  Pain control  DVT prophylaxis  OOB/Physical Therapy/Occupational Therapy   Medicine Consult    Patient to be evaluated at home by her [facility] agency for additional hours    Patient treated for presumed COPD exacerbation on 1/5. She was started on a 3 day course of azithromycin and a 5 day course of prednisone. She is optimized for discharge as her symptoms are improving however she will need to follow up with her PCP within one week of discharge. PCP (Dr. Vega) called and notified. They will call the patient this week to arrange follow up appointment.      Admitted on 1/3/25  Attending: Dr. Jiang  Surgery: ACDF C5-7 with illiac crest bone graft  Jovana-op Antibiotics  Pain control  DVT prophylaxis  OOB/Physical Therapy/Occupational Therapy   Medicine Consult    Patient to be evaluated at home by her [facility] agency for additional hours. Patient would benefit from additional [facility] hours for assistance with ADLs post operatively.     Patient treated for presumed COPD exacerbation on 1/5. She was started on a 3 day course of azithromycin and a 5 day course of prednisone. She is optimized for discharge as her symptoms are improving however she will need to follow up with her PCP within one week of discharge. PCP (Dr. Vega) called and notified. They will call the patient this week to arrange follow up appointment.

## 2025-01-04 NOTE — OCCUPATIONAL THERAPY INITIAL EVALUATION ADULT - ADDITIONAL COMMENTS
Pt states she lives alone in an elevator accessible apartment, no CHAPITO. Pt reports being independent in ADLs, IADLs, and mobility tasks, use of cane. Pt mentioned her daughter will be staying with her for 2 weeks to assist her as needed.

## 2025-01-04 NOTE — OCCUPATIONAL THERAPY INITIAL EVALUATION ADULT - GENERAL OBSERVATIONS, REHAB EVAL
hand grasp, leg strength strong and equal bilaterally
Pt received semi-supine in bed, +tele, +heplock, +b/l SCDs, +cervical collar, NAD, and agreeable to OT. Cleared by DAKOTA Alejandra to see.
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
## 2025-01-04 NOTE — OCCUPATIONAL THERAPY INITIAL EVALUATION ADULT - MODIFIED CLINICAL TEST OF SENSORY INTEGRATION IN BALANCE TEST
Pt able to ambulate 120 feet with supervision-SBA using RW, demonstrating fairly steady gait, decreased step length, and min verbal cues for management of RW. Pt noted to be tachy 126-130 BPM during ambulation, able to recover to 110s during standing rest breaks.

## 2025-01-05 LAB
ANION GAP SERPL CALC-SCNC: 11 MMOL/L — SIGNIFICANT CHANGE UP (ref 5–17)
BASOPHILS # BLD AUTO: 0 K/UL — SIGNIFICANT CHANGE UP (ref 0–0.2)
BASOPHILS NFR BLD AUTO: 0 % — SIGNIFICANT CHANGE UP (ref 0–2)
BUN SERPL-MCNC: 21 MG/DL — SIGNIFICANT CHANGE UP (ref 7–23)
CALCIUM SERPL-MCNC: 9.4 MG/DL — SIGNIFICANT CHANGE UP (ref 8.4–10.5)
CHLORIDE SERPL-SCNC: 105 MMOL/L — SIGNIFICANT CHANGE UP (ref 96–108)
CO2 SERPL-SCNC: 24 MMOL/L — SIGNIFICANT CHANGE UP (ref 22–31)
CREAT SERPL-MCNC: 0.81 MG/DL — SIGNIFICANT CHANGE UP (ref 0.5–1.3)
EGFR: 77 ML/MIN/1.73M2 — SIGNIFICANT CHANGE UP
EOSINOPHIL # BLD AUTO: 0 K/UL — SIGNIFICANT CHANGE UP (ref 0–0.5)
EOSINOPHIL NFR BLD AUTO: 0 % — SIGNIFICANT CHANGE UP (ref 0–6)
GLUCOSE SERPL-MCNC: 98 MG/DL — SIGNIFICANT CHANGE UP (ref 70–99)
HCT VFR BLD CALC: 40.6 % — SIGNIFICANT CHANGE UP (ref 34.5–45)
HGB BLD-MCNC: 13.3 G/DL — SIGNIFICANT CHANGE UP (ref 11.5–15.5)
LYMPHOCYTES # BLD AUTO: 0.79 K/UL — LOW (ref 1–3.3)
LYMPHOCYTES # BLD AUTO: 3.5 % — LOW (ref 13–44)
MCHC RBC-ENTMCNC: 27.3 PG — SIGNIFICANT CHANGE UP (ref 27–34)
MCHC RBC-ENTMCNC: 32.8 G/DL — SIGNIFICANT CHANGE UP (ref 32–36)
MCV RBC AUTO: 83.4 FL — SIGNIFICANT CHANGE UP (ref 80–100)
MONOCYTES # BLD AUTO: 0.59 K/UL — SIGNIFICANT CHANGE UP (ref 0–0.9)
MONOCYTES NFR BLD AUTO: 2.6 % — SIGNIFICANT CHANGE UP (ref 2–14)
NEUTROPHILS # BLD AUTO: 20.52 K/UL — HIGH (ref 1.8–7.4)
NEUTROPHILS NFR BLD AUTO: 86.1 % — HIGH (ref 43–77)
PLATELET # BLD AUTO: 286 K/UL — SIGNIFICANT CHANGE UP (ref 150–400)
POTASSIUM SERPL-MCNC: 3.8 MMOL/L — SIGNIFICANT CHANGE UP (ref 3.5–5.3)
POTASSIUM SERPL-SCNC: 3.8 MMOL/L — SIGNIFICANT CHANGE UP (ref 3.5–5.3)
RBC # BLD: 4.87 M/UL — SIGNIFICANT CHANGE UP (ref 3.8–5.2)
RBC # FLD: 14.7 % — HIGH (ref 10.3–14.5)
SODIUM SERPL-SCNC: 140 MMOL/L — SIGNIFICANT CHANGE UP (ref 135–145)
WBC # BLD: 22.7 K/UL — HIGH (ref 3.8–10.5)
WBC # FLD AUTO: 22.7 K/UL — HIGH (ref 3.8–10.5)

## 2025-01-05 PROCEDURE — 71045 X-RAY EXAM CHEST 1 VIEW: CPT | Mod: 26

## 2025-01-05 PROCEDURE — 99233 SBSQ HOSP IP/OBS HIGH 50: CPT

## 2025-01-05 RX ORDER — PREDNISONE 5 MG
40 TABLET ORAL DAILY
Refills: 0 | Status: DISCONTINUED | OUTPATIENT
Start: 2025-01-05 | End: 2025-01-06

## 2025-01-05 RX ORDER — POTASSIUM CHLORIDE 600 MG/1
20 TABLET, FILM COATED, EXTENDED RELEASE ORAL ONCE
Refills: 0 | Status: COMPLETED | OUTPATIENT
Start: 2025-01-05 | End: 2025-01-05

## 2025-01-05 RX ORDER — AZITHROMYCIN MONOHYDRATE 200 MG/5ML
500 POWDER, FOR SUSPENSION ORAL DAILY
Refills: 0 | Status: DISCONTINUED | OUTPATIENT
Start: 2025-01-05 | End: 2025-01-06

## 2025-01-05 RX ORDER — IPRATROPIUM BROMIDE AND ALBUTEROL SULFATE .5; 2.5 MG/3ML; MG/3ML
3 SOLUTION RESPIRATORY (INHALATION) EVERY 6 HOURS
Refills: 0 | Status: DISCONTINUED | OUTPATIENT
Start: 2025-01-05 | End: 2025-01-06

## 2025-01-05 RX ADMIN — GABAPENTIN 100 MILLIGRAM(S): 300 CAPSULE ORAL at 06:36

## 2025-01-05 RX ADMIN — ACETAMINOPHEN 1000 MILLIGRAM(S): 80 SOLUTION/ DROPS ORAL at 22:20

## 2025-01-05 RX ADMIN — POTASSIUM CHLORIDE 20 MILLIEQUIVALENT(S): 600 TABLET, FILM COATED, EXTENDED RELEASE ORAL at 17:03

## 2025-01-05 RX ADMIN — Medication 17 GRAM(S): at 11:21

## 2025-01-05 RX ADMIN — AZITHROMYCIN MONOHYDRATE 500 MILLIGRAM(S): 200 POWDER, FOR SUSPENSION ORAL at 13:01

## 2025-01-05 RX ADMIN — ACETAMINOPHEN 1000 MILLIGRAM(S): 80 SOLUTION/ DROPS ORAL at 06:36

## 2025-01-05 RX ADMIN — ACETAMINOPHEN 1000 MILLIGRAM(S): 80 SOLUTION/ DROPS ORAL at 13:03

## 2025-01-05 RX ADMIN — Medication 40 MILLIGRAM(S): at 19:10

## 2025-01-05 RX ADMIN — Medication 10 MILLIGRAM(S): at 06:36

## 2025-01-05 RX ADMIN — IPRATROPIUM BROMIDE AND ALBUTEROL SULFATE 3 MILLILITER(S): .5; 2.5 SOLUTION RESPIRATORY (INHALATION) at 17:03

## 2025-01-05 RX ADMIN — GABAPENTIN 100 MILLIGRAM(S): 300 CAPSULE ORAL at 22:20

## 2025-01-05 RX ADMIN — IPRATROPIUM BROMIDE AND ALBUTEROL SULFATE 3 MILLILITER(S): .5; 2.5 SOLUTION RESPIRATORY (INHALATION) at 13:03

## 2025-01-05 RX ADMIN — GABAPENTIN 100 MILLIGRAM(S): 300 CAPSULE ORAL at 13:02

## 2025-01-05 RX ADMIN — PANTOPRAZOLE 40 MILLIGRAM(S): 40 TABLET, DELAYED RELEASE ORAL at 06:36

## 2025-01-05 NOTE — PROGRESS NOTE ADULT - SUBJECTIVE AND OBJECTIVE BOX
Patient is a 74y old  Female who presents with a chief complaint of Neck pain (05 Jan 2025 06:05)        SUBJECTIVE:  Patient was seen and examined at bedside, states has had increased sputum production, described as white, corroborated by nursing.  Denies F/C, CP, feeling SOB or SMYTH.    Overnight Events : NAEON    Last Bowel Movement: 03-Jan-2025 (01-05)  Last Bowel Movement: 03-Jan-2025 (01-04)  Last Bowel Movement: 03-Jan-2025 (01-04)  Last Bowel Movement: 03-Jan-2025 (01-03)  Last Bowel Movement: 03-Jan-2025 (01-03)      Review of systems: 12 point Review of systems negative unless otherwise documented elsewhere in note.     Diet, Regular:      Special Instructions for Nursing:  Unless airway concerns (01-03-25 @ 10:48) [Active]      MEDICATIONS:  MEDICATIONS  (STANDING):  acetaminophen     Tablet .. 1000 milliGRAM(s) Oral every 8 hours  albuterol/ipratropium for Nebulization 3 milliLiter(s) Nebulizer every 6 hours  amLODIPine   Tablet 10 milliGRAM(s) Oral daily  azithromycin   Tablet 500 milliGRAM(s) Oral daily  gabapentin 100 milliGRAM(s) Oral three times a day  lactated ringers. 1000 milliLiter(s) (100 mL/Hr) IV Continuous <Continuous>  ondansetron   Disintegrating Tablet 4 milliGRAM(s) Oral every 6 hours  pantoprazole    Tablet 40 milliGRAM(s) Oral before breakfast  polyethylene glycol 3350 17 Gram(s) Oral daily  potassium chloride   Solution 20 milliEquivalent(s) Oral once  predniSONE   Tablet 40 milliGRAM(s) Oral daily  senna 2 Tablet(s) Oral at bedtime    MEDICATIONS  (PRN):  artificial  tears Solution 1 Drop(s) Both EYES three times a day PRN Dry Eyes  benzocaine/menthol Lozenge 1 Lozenge Oral four times a day PRN Sore Throat  HYDROmorphone  Injectable 0.5 milliGRAM(s) IV Push every 15 minutes PRN breakthrough pain in PACU  HYDROmorphone  Injectable 0.5 milliGRAM(s) IV Push every 6 hours PRN breakthrough pain on floor  methocarbamol 500 milliGRAM(s) Oral every 8 hours PRN Muscle Spasm  oxyCODONE    IR 5 milliGRAM(s) Oral every 4 hours PRN Severe Pain (7 - 10)  promethazine 25 milliGRAM(s) Oral daily PRN vertigo      Allergies    No Known Allergies    Intolerances        OBJECTIVE:  Vital Signs Last 24 Hrs  T(C): 36.8 (05 Jan 2025 12:30), Max: 37.2 (04 Jan 2025 16:51)  T(F): 98.3 (05 Jan 2025 12:30), Max: 99 (04 Jan 2025 16:51)  HR: 76 (05 Jan 2025 12:30) (62 - 86)  BP: 147/69 (05 Jan 2025 12:30) (147/69 - 163/85)  BP(mean): --  RR: 18 (05 Jan 2025 12:30) (15 - 19)  SpO2: 95% (05 Jan 2025 12:30) (93% - 98%)    Parameters below as of 05 Jan 2025 12:30  Patient On (Oxygen Delivery Method): room air      I&O's Summary    04 Jan 2025 07:01  -  05 Jan 2025 07:00  --------------------------------------------------------  IN: 800 mL / OUT: 1500 mL / NET: -700 mL        PHYSICAL EXAM:  Gen: sitting in chair at time of exam, not in distress   CV: RRR, +S1/S2  Pulm: noted cough w white sputum production, rhonchorous lung sounds also noted, no WOB, wheezing or crackles, no accessory muscle use  Abd: soft, NTND  Skin: warm and dry, no new rashes   Ext: moving all 4 extremities spontaneously , no edema  ,  Neuro: AOx3, no gross focal neurological deficits  Psych: affect and behavior appropriate, pleasant at time of interview    LABS:                        13.3   22.70 )-----------( 286      ( 05 Jan 2025 05:30 )             40.6     01-05    140  |  105  |  21  ----------------------------<  98  3.8   |  24  |  0.81    Ca    9.4      05 Jan 2025 05:30          CAPILLARY BLOOD GLUCOSE        Urinalysis Basic - ( 05 Jan 2025 05:30 )    Color: x / Appearance: x / SG: x / pH: x  Gluc: 98 mg/dL / Ketone: x  / Bili: x / Urobili: x   Blood: x / Protein: x / Nitrite: x   Leuk Esterase: x / RBC: x / WBC x   Sq Epi: x / Non Sq Epi: x / Bacteria: x        MICRODATA:      RADIOLOGY/OTHER STUDIES:

## 2025-01-05 NOTE — PROGRESS NOTE ADULT - ASSESSMENT
74F PMH remote L breast CA (sp chemo and RT 2005), COPD, GERD, HTN, osteoporosis, sciatica presenting with worsening neck pain found to have cervical stenosis sp procedure.    #cervical stenosis sp procedure 1/3  - plan per ortho    #HTN  - cw home amlodipine 10, sp IV hydral 5 once yesterday PM post OR  - pressures currently better, mostly 140s systolic    #COPD  - given new increase sputum production, cough, lung exam w rhonchi and increased WBC to 22s, will treat empirically for COPD exacerbation and send infx workup  - start azithro 500mg daily plan for 3d course  - start prednisone 40mg daily plan for 5d course  - standing duonebs q6 (hold home advair and spiriva given ptn intermittently tachycardic >110s w exertion)  - send RVP, sputum culture  - CXR no acute findings    - monitor ptn symptoms and O2 sat    #GERD  - pantoprazole, at home on omeprazole    #osteoporosis   - on alendronate q7d    #sciatica  - cw home gabapentin, robaxin PRN

## 2025-01-05 NOTE — PROGRESS NOTE ADULT - SUBJECTIVE AND OBJECTIVE BOX
Ortho Note    Pt comfortable without complaints, pain controlled  Denies CP, SOB, N/V, numbness/tingling     Vital Signs Last 24 Hrs  T(C): 36.6 (01-05-25 @ 08:30), Max: 36.6 (01-05-25 @ 08:30)  T(F): 97.8 (01-05-25 @ 08:30), Max: 97.8 (01-05-25 @ 08:30)  HR: 62 (01-05-25 @ 08:30) (62 - 62)  BP: 161/77 (01-05-25 @ 08:30) (161/77 - 161/77)  BP(mean): --  RR: 18 (01-05-25 @ 08:30) (18 - 18)  SpO2: 98% (01-05-25 @ 08:30) (98% - 98%)  I&O's Summary    04 Jan 2025 07:01  -  05 Jan 2025 07:00  --------------------------------------------------------  IN: 800 mL / OUT: 1500 mL / NET: -700 mL        General: Pt Alert and oriented, NAD  DSG C/D/I - prineo  Pulses: 2+ radial  Bilat UE  SILT  Motors   C5 (deltoid abduction) 5/5     C6 (biceps flexion)  5/5     C7 (triceps extension) 5/5     C8 (finger flexion)  5/5     T1 (interosseous) 5/5                           13.3   22.70 )-----------( 286      ( 05 Jan 2025 05:30 )             40.6     01-05    140  |  105  |  21  ----------------------------<  98  3.8   |  24  |  0.81    Ca    9.4      05 Jan 2025 05:30      A/P: 74yFemale s/p C5-C7 ACDF with ICBG   - Stable  - Pain Control  PT progress:  “Pt able to ambulate 120 feet with supervision-SBA using RW, demonstrating fairly steady gait, decreased step length, and min verbal cues for management of RW”  Drains:  d/c’d 1/4  DVT ppx - SCDs  Dispo: Pending OT clearance most likely today    Ortho Pager [phone]

## 2025-01-06 ENCOUNTER — TRANSCRIPTION ENCOUNTER (OUTPATIENT)
Age: 75
End: 2025-01-06

## 2025-01-06 VITALS
TEMPERATURE: 98 F | HEART RATE: 110 BPM | SYSTOLIC BLOOD PRESSURE: 157 MMHG | DIASTOLIC BLOOD PRESSURE: 57 MMHG | OXYGEN SATURATION: 99 % | RESPIRATION RATE: 18 BRPM

## 2025-01-06 LAB
ANION GAP SERPL CALC-SCNC: 12 MMOL/L — SIGNIFICANT CHANGE UP (ref 5–17)
BASOPHILS # BLD AUTO: 0.02 K/UL — SIGNIFICANT CHANGE UP (ref 0–0.2)
BASOPHILS NFR BLD AUTO: 0.1 % — SIGNIFICANT CHANGE UP (ref 0–2)
BUN SERPL-MCNC: 19 MG/DL — SIGNIFICANT CHANGE UP (ref 7–23)
CALCIUM SERPL-MCNC: 8.8 MG/DL — SIGNIFICANT CHANGE UP (ref 8.4–10.5)
CHLORIDE SERPL-SCNC: 105 MMOL/L — SIGNIFICANT CHANGE UP (ref 96–108)
CO2 SERPL-SCNC: 23 MMOL/L — SIGNIFICANT CHANGE UP (ref 22–31)
CREAT SERPL-MCNC: 0.78 MG/DL — SIGNIFICANT CHANGE UP (ref 0.5–1.3)
EGFR: 80 ML/MIN/1.73M2 — SIGNIFICANT CHANGE UP
EOSINOPHIL # BLD AUTO: 0 K/UL — SIGNIFICANT CHANGE UP (ref 0–0.5)
EOSINOPHIL NFR BLD AUTO: 0 % — SIGNIFICANT CHANGE UP (ref 0–6)
GLUCOSE SERPL-MCNC: 152 MG/DL — HIGH (ref 70–99)
HCT VFR BLD CALC: 38.9 % — SIGNIFICANT CHANGE UP (ref 34.5–45)
HGB BLD-MCNC: 12.9 G/DL — SIGNIFICANT CHANGE UP (ref 11.5–15.5)
IMM GRANULOCYTES NFR BLD AUTO: 0.7 % — SIGNIFICANT CHANGE UP (ref 0–0.9)
LYMPHOCYTES # BLD AUTO: 1.23 K/UL — SIGNIFICANT CHANGE UP (ref 1–3.3)
LYMPHOCYTES # BLD AUTO: 7.5 % — LOW (ref 13–44)
MCHC RBC-ENTMCNC: 28.1 PG — SIGNIFICANT CHANGE UP (ref 27–34)
MCHC RBC-ENTMCNC: 33.2 G/DL — SIGNIFICANT CHANGE UP (ref 32–36)
MCV RBC AUTO: 84.7 FL — SIGNIFICANT CHANGE UP (ref 80–100)
MONOCYTES # BLD AUTO: 0.51 K/UL — SIGNIFICANT CHANGE UP (ref 0–0.9)
MONOCYTES NFR BLD AUTO: 3.1 % — SIGNIFICANT CHANGE UP (ref 2–14)
NEUTROPHILS # BLD AUTO: 14.45 K/UL — HIGH (ref 1.8–7.4)
NEUTROPHILS NFR BLD AUTO: 88.6 % — HIGH (ref 43–77)
NRBC # BLD: 0 /100 WBCS — SIGNIFICANT CHANGE UP (ref 0–0)
PLATELET # BLD AUTO: 263 K/UL — SIGNIFICANT CHANGE UP (ref 150–400)
POTASSIUM SERPL-MCNC: 3.9 MMOL/L — SIGNIFICANT CHANGE UP (ref 3.5–5.3)
POTASSIUM SERPL-SCNC: 3.9 MMOL/L — SIGNIFICANT CHANGE UP (ref 3.5–5.3)
RAPID RVP RESULT: SIGNIFICANT CHANGE UP
RBC # BLD: 4.59 M/UL — SIGNIFICANT CHANGE UP (ref 3.8–5.2)
RBC # FLD: 15.1 % — HIGH (ref 10.3–14.5)
SARS-COV-2 RNA SPEC QL NAA+PROBE: SIGNIFICANT CHANGE UP
SODIUM SERPL-SCNC: 140 MMOL/L — SIGNIFICANT CHANGE UP (ref 135–145)
WBC # BLD: 16.33 K/UL — HIGH (ref 3.8–10.5)
WBC # FLD AUTO: 16.33 K/UL — HIGH (ref 3.8–10.5)

## 2025-01-06 PROCEDURE — 71045 X-RAY EXAM CHEST 1 VIEW: CPT

## 2025-01-06 PROCEDURE — 82962 GLUCOSE BLOOD TEST: CPT

## 2025-01-06 PROCEDURE — 97530 THERAPEUTIC ACTIVITIES: CPT

## 2025-01-06 PROCEDURE — 97165 OT EVAL LOW COMPLEX 30 MIN: CPT

## 2025-01-06 PROCEDURE — 76000 FLUOROSCOPY <1 HR PHYS/QHP: CPT

## 2025-01-06 PROCEDURE — 99233 SBSQ HOSP IP/OBS HIGH 50: CPT

## 2025-01-06 PROCEDURE — 36415 COLL VENOUS BLD VENIPUNCTURE: CPT

## 2025-01-06 PROCEDURE — 86901 BLOOD TYPING SEROLOGIC RH(D): CPT

## 2025-01-06 PROCEDURE — 94640 AIRWAY INHALATION TREATMENT: CPT

## 2025-01-06 PROCEDURE — C1889: CPT

## 2025-01-06 PROCEDURE — 97535 SELF CARE MNGMENT TRAINING: CPT

## 2025-01-06 PROCEDURE — 85025 COMPLETE CBC W/AUTO DIFF WBC: CPT

## 2025-01-06 PROCEDURE — C1713: CPT

## 2025-01-06 PROCEDURE — 80048 BASIC METABOLIC PNL TOTAL CA: CPT

## 2025-01-06 PROCEDURE — 0225U NFCT DS DNA&RNA 21 SARSCOV2: CPT

## 2025-01-06 PROCEDURE — 86850 RBC ANTIBODY SCREEN: CPT

## 2025-01-06 PROCEDURE — 86900 BLOOD TYPING SEROLOGIC ABO: CPT

## 2025-01-06 RX ORDER — PREDNISONE 5 MG
2 TABLET ORAL
Qty: 8 | Refills: 0
Start: 2025-01-06 | End: 2025-01-09

## 2025-01-06 RX ORDER — AZITHROMYCIN MONOHYDRATE 200 MG/5ML
1 POWDER, FOR SUSPENSION ORAL
Qty: 4 | Refills: 0
Start: 2025-01-06 | End: 2025-01-09

## 2025-01-06 RX ADMIN — IPRATROPIUM BROMIDE AND ALBUTEROL SULFATE 3 MILLILITER(S): .5; 2.5 SOLUTION RESPIRATORY (INHALATION) at 11:26

## 2025-01-06 RX ADMIN — GABAPENTIN 100 MILLIGRAM(S): 300 CAPSULE ORAL at 14:13

## 2025-01-06 RX ADMIN — PANTOPRAZOLE 40 MILLIGRAM(S): 40 TABLET, DELAYED RELEASE ORAL at 05:45

## 2025-01-06 RX ADMIN — Medication 10 MILLIGRAM(S): at 05:45

## 2025-01-06 RX ADMIN — POLYSORBATE 80 1 DROP(S): 100 SOLUTION/ DROPS OPHTHALMIC at 07:41

## 2025-01-06 RX ADMIN — Medication 17 GRAM(S): at 11:26

## 2025-01-06 RX ADMIN — ACETAMINOPHEN 1000 MILLIGRAM(S): 80 SOLUTION/ DROPS ORAL at 05:45

## 2025-01-06 RX ADMIN — IPRATROPIUM BROMIDE AND ALBUTEROL SULFATE 3 MILLILITER(S): .5; 2.5 SOLUTION RESPIRATORY (INHALATION) at 00:21

## 2025-01-06 RX ADMIN — GABAPENTIN 100 MILLIGRAM(S): 300 CAPSULE ORAL at 05:45

## 2025-01-06 RX ADMIN — AZITHROMYCIN MONOHYDRATE 500 MILLIGRAM(S): 200 POWDER, FOR SUSPENSION ORAL at 11:27

## 2025-01-06 RX ADMIN — Medication 40 MILLIGRAM(S): at 05:46

## 2025-01-06 RX ADMIN — IPRATROPIUM BROMIDE AND ALBUTEROL SULFATE 3 MILLILITER(S): .5; 2.5 SOLUTION RESPIRATORY (INHALATION) at 05:49

## 2025-01-06 RX ADMIN — ACETAMINOPHEN 1000 MILLIGRAM(S): 80 SOLUTION/ DROPS ORAL at 14:13

## 2025-01-06 NOTE — PROGRESS NOTE ADULT - SUBJECTIVE AND OBJECTIVE BOX
Patient is a 74y old  Female who presents with a chief complaint of Neck pain (06 Jan 2025 06:40)        SUBJECTIVE:  Patient was seen and examined at bedside, states cough and sputum production much improved since yesterday.    Overnight Events : CXR clear, RVP negative, sputum production ceased so unable to send sputum cx    Last Bowel Movement: 05-Jan-2025 (01-06)  Last Bowel Movement: 05-Jan-2025 (01-05)  Last Bowel Movement: 03-Jan-2025 (01-05)  Last Bowel Movement: 03-Jan-2025 (01-04)  Last Bowel Movement: 03-Jan-2025 (01-04)  Last Bowel Movement: 03-Jan-2025 (01-03)  Last Bowel Movement: 03-Jan-2025 (01-03)      Review of systems: 12 point Review of systems negative unless otherwise documented elsewhere in note.     Diet, Regular:      Special Instructions for Nursing:  Unless airway concerns (01-03-25 @ 10:48) [Active]      MEDICATIONS:  MEDICATIONS  (STANDING):  acetaminophen     Tablet .. 1000 milliGRAM(s) Oral every 8 hours  albuterol/ipratropium for Nebulization 3 milliLiter(s) Nebulizer every 6 hours  amLODIPine   Tablet 10 milliGRAM(s) Oral daily  azithromycin   Tablet 500 milliGRAM(s) Oral daily  gabapentin 100 milliGRAM(s) Oral three times a day  lactated ringers. 1000 milliLiter(s) (100 mL/Hr) IV Continuous <Continuous>  ondansetron   Disintegrating Tablet 4 milliGRAM(s) Oral every 6 hours  pantoprazole    Tablet 40 milliGRAM(s) Oral before breakfast  polyethylene glycol 3350 17 Gram(s) Oral daily  predniSONE   Tablet 40 milliGRAM(s) Oral daily  senna 2 Tablet(s) Oral at bedtime    MEDICATIONS  (PRN):  artificial  tears Solution 1 Drop(s) Both EYES three times a day PRN Dry Eyes  benzocaine/menthol Lozenge 1 Lozenge Oral four times a day PRN Sore Throat  HYDROmorphone  Injectable 0.5 milliGRAM(s) IV Push every 15 minutes PRN breakthrough pain in PACU  HYDROmorphone  Injectable 0.5 milliGRAM(s) IV Push every 6 hours PRN breakthrough pain on floor  methocarbamol 500 milliGRAM(s) Oral every 8 hours PRN Muscle Spasm  oxyCODONE    IR 5 milliGRAM(s) Oral every 4 hours PRN Severe Pain (7 - 10)  promethazine 25 milliGRAM(s) Oral daily PRN vertigo      Allergies    No Known Allergies    Intolerances        OBJECTIVE:  Vital Signs Last 24 Hrs  T(C): 36.6 (06 Jan 2025 08:28), Max: 36.9 (05 Jan 2025 16:50)  T(F): 97.9 (06 Jan 2025 08:28), Max: 98.5 (05 Jan 2025 16:50)  HR: 94 (06 Jan 2025 08:28) (76 - 94)  BP: 149/80 (06 Jan 2025 08:28) (126/58 - 159/78)  BP(mean): --  RR: 16 (06 Jan 2025 08:28) (16 - 20)  SpO2: 94% (06 Jan 2025 08:28) (94% - 98%)    Parameters below as of 06 Jan 2025 08:28  Patient On (Oxygen Delivery Method): room air      I&O's Summary    05 Jan 2025 07:01  -  06 Jan 2025 07:00  --------------------------------------------------------  IN: 0 mL / OUT: 400 mL / NET: -400 mL    06 Jan 2025 07:01  -  06 Jan 2025 11:56  --------------------------------------------------------  IN: 240 mL / OUT: 0 mL / NET: 240 mL        PHYSICAL EXAM:  Gen: Resting in bed at time of exam, not in distress   CV: RRR, +S1/S2  Pulm: no wheezing, no crackles  no increase in work of breathing, clear and rhonchi from yesterday resolved, intermittent cough w deep breath, sputum production resolved  Abd: soft, NTND  Skin: warm and dry, no new rashes   Ext: moving all 4 extremities spontaneously , no edema  ,  Neuro: AOx3, no gross focal neurological deficits  Psych: affect and behavior appropriate, pleasant at time of interview    LABS:                        12.9   16.33 )-----------( 263      ( 06 Jan 2025 05:30 )             38.9     01-06    140  |  105  |  19  ----------------------------<  152[H]  3.9   |  23  |  0.78    Ca    8.8      06 Jan 2025 05:30          CAPILLARY BLOOD GLUCOSE        Urinalysis Basic - ( 06 Jan 2025 05:30 )    Color: x / Appearance: x / SG: x / pH: x  Gluc: 152 mg/dL / Ketone: x  / Bili: x / Urobili: x   Blood: x / Protein: x / Nitrite: x   Leuk Esterase: x / RBC: x / WBC x   Sq Epi: x / Non Sq Epi: x / Bacteria: x        MICRODATA:      RADIOLOGY/OTHER STUDIES:

## 2025-01-06 NOTE — PROGRESS NOTE ADULT - SUBJECTIVE AND OBJECTIVE BOX
Ortho Note    Pt comfortable without complaints, pain controlled. No longer having Viral URI symptoms. Viral panel neg.  Denies CP, SOB, N/V, numbness/tingling     Vital Signs Last 24 Hrs  T(C): 36.5 (01-06-25 @ 05:42), Max: 36.5 (01-06-25 @ 05:42)  T(F): 97.7 (01-06-25 @ 05:42), Max: 97.7 (01-06-25 @ 05:42)  HR: 79 (01-06-25 @ 05:42) (79 - 79)  BP: 159/78 (01-06-25 @ 05:42) (159/78 - 159/78)  BP(mean): --  RR: 18 (01-06-25 @ 05:42) (18 - 18)  SpO2: 98% (01-06-25 @ 05:42) (98% - 98%)  I&O's Summary    04 Jan 2025 07:01  -  05 Jan 2025 07:00  --------------------------------------------------------  IN: 800 mL / OUT: 1500 mL / NET: -700 mL    05 Jan 2025 07:01  -  06 Jan 2025 06:40  --------------------------------------------------------  IN: 0 mL / OUT: 400 mL / NET: -400 mL        General: Pt Alert and oriented, NAD  DSG C/D/I - prineo  Cervical collar in place  Pulses: 2+ radial  Bilat UE  SILT  Motors   C5 (deltoid abduction) 5/5     C6 (biceps flexion)  5/5     C7 (triceps extension) 5/5     C8 (finger flexion)  5/5     T1 (interosseous) 5/5                           13.3   22.70 )-----------( 286      ( 05 Jan 2025 05:30 )             40.6     01-05    140  |  105  |  21  ----------------------------<  98  3.8   |  24  |  0.81    Ca    9.4      05 Jan 2025 05:30        A/P: 74yFemale s/p C5-C7 ACDF with ICBG on 1/3    - Stable  - Pain Control  - DVT ppx: SCDs  - PT, WBS: WBAT  - Dispo: HPT/OT    Ortho Pager [phone]

## 2025-01-06 NOTE — PROGRESS NOTE ADULT - ASSESSMENT
74F PMH remote L breast CA (sp chemo and RT 2005), COPD, GERD, HTN, osteoporosis, sciatica presenting with worsening neck pain found to have cervical stenosis sp procedure.    #cervical stenosis sp procedure 1/3  - plan per ortho    #HTN  - cw home amlodipine 10, sp IV hydral 5 once yesterday PM post OR  - pressures currently better, mostly 140s systolic    #COPD exacerbation, now improved  - given new increase sputum production, cough, lung exam w rhonchi and increased WBC to 22s on 1/5, treated empirically for COPD exacerbation  - outpatient cw azithro 500mg daily plan for 3d course  - outpatient cw prednisone 40mg daily plan for 5d course  - standing duonebs q6 inpatient, restart home advair and spiriva on discharge  - negative RVP, sputum culture couldn't be sent bc sputum production resolved  - CXR no acute findings    - given ptn improved clinically and WBC downtrending to 16s today, continues to be on room air, stable for discharge w close PCP follow up    #GERD  - pantoprazole, at home on omeprazole    #osteoporosis   - on alendronate q7d    #sciatica  - cw home gabapentin, robaxin PRN   74F PMH remote L breast CA (sp chemo and RT 2005), COPD, GERD, HTN, osteoporosis, sciatica presenting with worsening neck pain found to have cervical stenosis sp procedure.    #cervical stenosis sp procedure 1/3  - plan per ortho    #HTN  - cw home amlodipine 10, sp IV hydral 5 once yesterday PM post OR  - pressures currently better, mostly 140s systolic    #COPD exacerbation, now improved  - given new increase sputum production, cough, lung exam w rhonchi and increased WBC to 22s on 1/5, treated empirically for COPD exacerbation  - outpatient cw azithro 500mg daily plan for 3d course  - outpatient cw prednisone 40mg daily plan for 5d course  - standing duonebs q6 inpatient, restart home advair and spiriva on discharge  - negative RVP, sputum culture couldn't be sent bc sputum production resolved  - CXR no acute findings    - given ptn improved clinically and WBC downtrending to 16s today, continues to be on room air, stable for discharge w close PCP follow up    #GERD  - pantoprazole, at home on omeprazole    #osteoporosis   - on alendronate q7d    #sciatica  - cw home gabapentin, robaxin PRN    #mild hyponatremia to 134 on admission, monitored and treated with intravenous fluids, now resolved

## 2025-01-06 NOTE — DISCHARGE NOTE NURSING/CASE MANAGEMENT/SOCIAL WORK - FINANCIAL ASSISTANCE
[facility] provides services at a reduced cost to those who are determined to be eligible through [facility]’s financial assistance program. Information regarding [facility]’s financial assistance program can be found by going to [email or web address] or by calling [phone].

## 2025-01-06 NOTE — DISCHARGE NOTE NURSING/CASE MANAGEMENT/SOCIAL WORK - PATIENT PORTAL LINK FT
You can access the FollowMyHealth Patient Portal offered by [facility] by registering at the following website: [email or web address]. By joining [facility]’s FollowMyHealth portal, you will also be able to view your health information using other applications (apps) compatible with our system.

## 2025-01-07 PROBLEM — M54.2 CERVICALGIA: Chronic | Status: ACTIVE | Noted: 2024-12-27

## 2025-01-07 PROBLEM — R29.898 OTHER SYMPTOMS AND SIGNS INVOLVING THE MUSCULOSKELETAL SYSTEM: Chronic | Status: ACTIVE | Noted: 2025-01-02

## 2025-01-15 ENCOUNTER — APPOINTMENT (OUTPATIENT)
Dept: ORTHOPEDIC SURGERY | Facility: CLINIC | Age: 75
End: 2025-01-15
Payer: MEDICARE

## 2025-01-15 DIAGNOSIS — M48.02 SPINAL STENOSIS, CERVICAL REGION: ICD-10-CM

## 2025-01-15 DIAGNOSIS — Z79.899 OTHER LONG TERM (CURRENT) DRUG THERAPY: ICD-10-CM

## 2025-01-15 DIAGNOSIS — J44.1 CHRONIC OBSTRUCTIVE PULMONARY DISEASE WITH (ACUTE) EXACERBATION: ICD-10-CM

## 2025-01-15 DIAGNOSIS — M54.16 RADICULOPATHY, LUMBAR REGION: ICD-10-CM

## 2025-01-15 DIAGNOSIS — M54.12 RADICULOPATHY, CERVICAL REGION: ICD-10-CM

## 2025-01-15 DIAGNOSIS — I10 ESSENTIAL (PRIMARY) HYPERTENSION: ICD-10-CM

## 2025-01-15 DIAGNOSIS — E87.1 HYPO-OSMOLALITY AND HYPONATREMIA: ICD-10-CM

## 2025-01-15 DIAGNOSIS — M81.0 AGE-RELATED OSTEOPOROSIS WITHOUT CURRENT PATHOLOGICAL FRACTURE: ICD-10-CM

## 2025-01-15 DIAGNOSIS — K21.9 GASTRO-ESOPHAGEAL REFLUX DISEASE WITHOUT ESOPHAGITIS: ICD-10-CM

## 2025-01-15 DIAGNOSIS — M25.78 OSTEOPHYTE, VERTEBRAE: ICD-10-CM

## 2025-01-15 PROCEDURE — 99024 POSTOP FOLLOW-UP VISIT: CPT

## 2025-01-15 PROCEDURE — 72040 X-RAY EXAM NECK SPINE 2-3 VW: CPT

## 2025-02-12 ENCOUNTER — APPOINTMENT (OUTPATIENT)
Dept: ORTHOPEDIC SURGERY | Facility: CLINIC | Age: 75
End: 2025-02-12
Payer: MEDICARE

## 2025-02-12 DIAGNOSIS — M54.12 RADICULOPATHY, CERVICAL REGION: ICD-10-CM

## 2025-02-12 PROCEDURE — 72040 X-RAY EXAM NECK SPINE 2-3 VW: CPT

## 2025-02-12 PROCEDURE — 99024 POSTOP FOLLOW-UP VISIT: CPT

## 2025-04-17 ENCOUNTER — APPOINTMENT (OUTPATIENT)
Dept: ORTHOPEDIC SURGERY | Facility: CLINIC | Age: 75
End: 2025-04-17
Payer: MEDICARE

## 2025-04-17 DIAGNOSIS — M54.12 RADICULOPATHY, CERVICAL REGION: ICD-10-CM

## 2025-04-17 DIAGNOSIS — S49.92XA UNSPECIFIED INJURY OF LEFT SHOULDER AND UPPER ARM, INITIAL ENCOUNTER: ICD-10-CM

## 2025-04-17 PROCEDURE — 99214 OFFICE O/P EST MOD 30 MIN: CPT | Mod: 25

## 2025-04-17 PROCEDURE — 72050 X-RAY EXAM NECK SPINE 4/5VWS: CPT

## 2025-05-02 ENCOUNTER — APPOINTMENT (OUTPATIENT)
Dept: ORTHOPEDIC SURGERY | Facility: CLINIC | Age: 75
End: 2025-05-02
Payer: MEDICARE

## 2025-05-02 DIAGNOSIS — M25.512 PAIN IN LEFT SHOULDER: ICD-10-CM

## 2025-05-02 DIAGNOSIS — M54.12 RADICULOPATHY, CERVICAL REGION: ICD-10-CM

## 2025-05-02 PROCEDURE — 99214 OFFICE O/P EST MOD 30 MIN: CPT | Mod: 25

## 2025-05-02 PROCEDURE — 72050 X-RAY EXAM NECK SPINE 4/5VWS: CPT

## 2025-08-01 ENCOUNTER — APPOINTMENT (OUTPATIENT)
Dept: ORTHOPEDIC SURGERY | Facility: CLINIC | Age: 75
End: 2025-08-01
Payer: MEDICARE

## 2025-08-01 DIAGNOSIS — M54.12 RADICULOPATHY, CERVICAL REGION: ICD-10-CM

## 2025-08-01 DIAGNOSIS — M25.512 PAIN IN LEFT SHOULDER: ICD-10-CM

## 2025-08-01 PROCEDURE — 99214 OFFICE O/P EST MOD 30 MIN: CPT | Mod: 25

## 2025-08-01 PROCEDURE — 72050 X-RAY EXAM NECK SPINE 4/5VWS: CPT

## 2025-08-01 RX ORDER — GABAPENTIN 100 MG/1
100 CAPSULE ORAL 3 TIMES DAILY
Qty: 90 | Refills: 0 | Status: ACTIVE | COMMUNITY
Start: 2025-08-01 | End: 1900-01-01

## 2025-08-01 RX ORDER — TIZANIDINE 2 MG/1
2 TABLET ORAL
Qty: 40 | Refills: 2 | Status: ACTIVE | COMMUNITY
Start: 2025-08-01 | End: 1900-01-01

## 2025-08-06 ENCOUNTER — APPOINTMENT (OUTPATIENT)
Dept: ORTHOPEDIC SURGERY | Facility: CLINIC | Age: 75
End: 2025-08-06
Payer: MEDICARE

## 2025-08-06 DIAGNOSIS — M75.112 INCOMPLETE ROTATOR CUFF TEAR OR RUPTURE OF LEFT SHOULDER, NOT SPECIFIED AS TRAUMATIC: ICD-10-CM

## 2025-08-06 PROCEDURE — 99214 OFFICE O/P EST MOD 30 MIN: CPT

## 2025-08-12 ENCOUNTER — NON-APPOINTMENT (OUTPATIENT)
Age: 75
End: 2025-08-12

## 2025-08-21 ENCOUNTER — APPOINTMENT (OUTPATIENT)
Dept: ORTHOPEDIC SURGERY | Facility: CLINIC | Age: 75
End: 2025-08-21
Payer: MEDICARE

## 2025-08-21 DIAGNOSIS — Z86.79 PERSONAL HISTORY OF OTHER DISEASES OF THE CIRCULATORY SYSTEM: ICD-10-CM

## 2025-08-21 DIAGNOSIS — M19.90 UNSPECIFIED OSTEOARTHRITIS, UNSPECIFIED SITE: ICD-10-CM

## 2025-08-21 DIAGNOSIS — C80.1 MALIGNANT (PRIMARY) NEOPLASM, UNSPECIFIED: ICD-10-CM

## 2025-08-21 PROCEDURE — 99203 OFFICE O/P NEW LOW 30 MIN: CPT

## 2025-08-21 RX ORDER — ONDANSETRON 8 MG/1
8 TABLET, ORALLY DISINTEGRATING ORAL 3 TIMES DAILY
Qty: 15 | Refills: 4 | Status: ACTIVE | COMMUNITY
Start: 2025-08-21 | End: 1900-01-01

## 2025-08-21 RX ORDER — OXYCODONE AND ACETAMINOPHEN 5; 325 MG/1; MG/1
5-325 TABLET ORAL
Qty: 28 | Refills: 0 | Status: ACTIVE | COMMUNITY
Start: 2025-08-21 | End: 1900-01-01

## 2025-08-25 PROBLEM — Z86.79 HISTORY OF HYPERTENSION: Status: RESOLVED | Noted: 2024-01-16 | Resolved: 2025-08-25

## 2025-08-25 PROBLEM — M19.90 ARTHRITIS: Status: RESOLVED | Noted: 2025-08-25 | Resolved: 2025-08-25

## 2025-08-25 PROBLEM — C80.1 CANCER: Status: RESOLVED | Noted: 2025-08-25 | Resolved: 2025-08-25

## 2025-08-26 ENCOUNTER — APPOINTMENT (OUTPATIENT)
Age: 75
End: 2025-08-26

## 2025-08-29 ENCOUNTER — APPOINTMENT (OUTPATIENT)
Dept: ORTHOPEDIC SURGERY | Facility: CLINIC | Age: 75
End: 2025-08-29
Payer: MEDICARE

## 2025-08-29 DIAGNOSIS — M75.122 COMPLETE ROTATOR CUFF TEAR OR RUPTURE OF LEFT SHOULDER, NOT SPECIFIED AS TRAUMATIC: ICD-10-CM

## 2025-08-29 DIAGNOSIS — M75.112 INCOMPLETE ROTATOR CUFF TEAR OR RUPTURE OF LEFT SHOULDER, NOT SPECIFIED AS TRAUMATIC: ICD-10-CM

## 2025-08-29 PROCEDURE — 99024 POSTOP FOLLOW-UP VISIT: CPT

## 2025-08-29 PROCEDURE — 73030 X-RAY EXAM OF SHOULDER: CPT | Mod: LT

## 2025-09-15 ENCOUNTER — APPOINTMENT (OUTPATIENT)
Dept: ORTHOPEDIC SURGERY | Facility: CLINIC | Age: 75
End: 2025-09-15
Payer: MEDICARE

## 2025-09-15 DIAGNOSIS — M75.122 COMPLETE ROTATOR CUFF TEAR OR RUPTURE OF LEFT SHOULDER, NOT SPECIFIED AS TRAUMATIC: ICD-10-CM

## 2025-09-15 PROCEDURE — 99024 POSTOP FOLLOW-UP VISIT: CPT

## (undated) DEVICE — GLV 6.5 PROTEXIS (WHITE)

## (undated) DEVICE — POSITIONER FOAM EGG CRATE ULNAR 2PCS (PINK)

## (undated) DEVICE — DRAPE MICROSCOPE LEICA 54" X 150"

## (undated) DEVICE — SUT MONOCRYL 3-0 27" PS-2 UNDYED

## (undated) DEVICE — SUT ETHILON 6-0 18" PS-3

## (undated) DEVICE — HARVESTER BONE 10MM SHRT

## (undated) DEVICE — DRSG TEGADERM 4 X 4.75"

## (undated) DEVICE — SUT VICRYL 2-0 27" SH UNDYED

## (undated) DEVICE — Device

## (undated) DEVICE — GLV 7.5 PROTEXIS (WHITE)

## (undated) DEVICE — MIDAS REX MR8 MATCH HEAD FLUTED LG BORE 3MM X 14CM

## (undated) DEVICE — DRAIN JACKSON PRATT 10MM FLAT 3/4 NO TROCAR

## (undated) DEVICE — COVER BACK TABLE STRL 80/110IN 10/CA

## (undated) DEVICE — DRAPE 3/4 SHEET 52X76"

## (undated) DEVICE — PACK SPINE

## (undated) DEVICE — TEMPLATE 18H

## (undated) DEVICE — SUT VICRYL PLUS 3-0 18" CP-2 UNDYED (POP-OFF)

## (undated) DEVICE — SUT VICRYL 0 36" CT-1 UNDYED

## (undated) DEVICE — PREP CHLORAPREP HI-LITE ORANGE 26ML

## (undated) DEVICE — SPONGE PEANUT AUTO COUNT

## (undated) DEVICE — DRSG MASTISOL

## (undated) DEVICE — DRAPE TOWEL BLUE STICKY

## (undated) DEVICE — SUT VICRYL PLUS 2-0 18" CT-2 (POP-OFF)

## (undated) DEVICE — SUT ETHILON 3-0 18" PS-1

## (undated) DEVICE — CLIPPER BLADE GENERAL USE

## (undated) DEVICE — SUT SILK 2-0 12-18"

## (undated) DEVICE — DRAPE INSTRUMENT POUCH 6.75" X 11"

## (undated) DEVICE — SUT VICRYL 2-0 27" CT-2 UNDYED

## (undated) DEVICE — DRAPE GENERAL ENDOSCOPY

## (undated) DEVICE — DRSG STERISTRIPS 0.5 X 4"